# Patient Record
Sex: MALE | Race: WHITE | NOT HISPANIC OR LATINO | Employment: FULL TIME | URBAN - METROPOLITAN AREA
[De-identification: names, ages, dates, MRNs, and addresses within clinical notes are randomized per-mention and may not be internally consistent; named-entity substitution may affect disease eponyms.]

---

## 2017-07-10 DIAGNOSIS — E78.5 HYPERLIPIDEMIA: ICD-10-CM

## 2017-07-10 RX ORDER — ATORVASTATIN CALCIUM 10 MG/1
TABLET, FILM COATED ORAL
Qty: 90 TABLET | Refills: 3 | OUTPATIENT
Start: 2017-07-10

## 2017-07-13 ENCOUNTER — OFFICE VISIT (OUTPATIENT)
Dept: FAMILY MEDICINE CLINIC | Facility: CLINIC | Age: 52
End: 2017-07-13

## 2017-07-13 ENCOUNTER — RESULTS ENCOUNTER (OUTPATIENT)
Dept: FAMILY MEDICINE CLINIC | Facility: CLINIC | Age: 52
End: 2017-07-13

## 2017-07-13 VITALS
HEART RATE: 74 BPM | RESPIRATION RATE: 16 BRPM | DIASTOLIC BLOOD PRESSURE: 82 MMHG | SYSTOLIC BLOOD PRESSURE: 115 MMHG | TEMPERATURE: 97 F | BODY MASS INDEX: 23.86 KG/M2 | WEIGHT: 180 LBS | HEIGHT: 73 IN

## 2017-07-13 DIAGNOSIS — Z12.12 SCREENING FOR MALIGNANT NEOPLASM OF THE RECTUM: ICD-10-CM

## 2017-07-13 DIAGNOSIS — E78.49 OTHER HYPERLIPIDEMIA: Primary | ICD-10-CM

## 2017-07-13 DIAGNOSIS — M10.00 IDIOPATHIC GOUT, UNSPECIFIED CHRONICITY, UNSPECIFIED SITE: ICD-10-CM

## 2017-07-13 DIAGNOSIS — Z12.12 ENCOUNTER FOR COLORECTAL CANCER SCREENING: ICD-10-CM

## 2017-07-13 DIAGNOSIS — M10.079 IDIOPATHIC GOUT INVOLVING TOE, UNSPECIFIED CHRONICITY, UNSPECIFIED LATERALITY: ICD-10-CM

## 2017-07-13 DIAGNOSIS — Z12.11 ENCOUNTER FOR COLORECTAL CANCER SCREENING: ICD-10-CM

## 2017-07-13 DIAGNOSIS — D22.9 NEVUS: ICD-10-CM

## 2017-07-13 DIAGNOSIS — R79.89 ELEVATED SERUM CREATININE: ICD-10-CM

## 2017-07-13 PROCEDURE — 99214 OFFICE O/P EST MOD 30 MIN: CPT | Performed by: FAMILY MEDICINE

## 2017-07-13 RX ORDER — ALLOPURINOL 300 MG/1
300 TABLET ORAL DAILY
Qty: 90 TABLET | Refills: 3 | Status: SHIPPED | OUTPATIENT
Start: 2017-07-13 | End: 2018-06-06 | Stop reason: SDUPTHER

## 2017-07-13 RX ORDER — ATORVASTATIN CALCIUM 10 MG/1
10 TABLET, FILM COATED ORAL NIGHTLY
Qty: 90 TABLET | Refills: 3 | Status: SHIPPED | OUTPATIENT
Start: 2017-07-13 | End: 2018-06-06 | Stop reason: SDUPTHER

## 2017-07-13 NOTE — PROGRESS NOTES
"Chief Complaint   Patient presents with   • Hyperlipidemia   • Gout       Subjective   This patient presents the office to refill medicines.  Lipids are stable.  Gout is stable.  No recent gout attacks.  He would also like to check nevus on left upper chest at the base of the neck.  There is been no interval change.  Recent stressors including losing his father at age 89 , 7/20/17.  I have reviewed and updated his medications, medical history and problem list during today's office visit.        Social History   Substance Use Topics   • Smoking status: Former Smoker   • Smokeless tobacco: Never Used   • Alcohol use Yes       Review of Systems   Constitutional: Negative for fatigue.   Cardiovascular: Negative for chest pain.       Objective   /82  Pulse 74  Temp 97 °F (36.1 °C) (Oral)   Resp 16  Ht 73\" (185.4 cm)  Wt 180 lb (81.6 kg)  BMI 23.75 kg/m2  Body mass index is 23.75 kg/(m^2).  Physical Exam   Constitutional: He is cooperative. No distress.   Eyes: Conjunctivae and lids are normal.   Neck: Carotid bruit is not present. No tracheal deviation present.   Cardiovascular: Normal rate, regular rhythm and normal heart sounds.    No murmur heard.  Pulmonary/Chest: Effort normal and breath sounds normal.   Neurological: He is alert. He is not disoriented.   Skin: Skin is warm and dry.   Psychiatric: He has a normal mood and affect. His speech is normal and behavior is normal.   Vitals reviewed.      Data Reviewed:    Labs show elevated creatinine    Assessment/Plan     Problem List Items Addressed This Visit        Cardiovascular and Mediastinum    Hyperlipidemia - Primary    Relevant Medications    atorvastatin (LIPITOR) 10 MG tablet       Musculoskeletal and Integument    Nevus       Other    Idiopathic gout    Relevant Medications    allopurinol (ZYLOPRIM) 300 MG tablet    Elevated serum creatinine    Relevant Orders    Creatinine, Serum      Other Visit Diagnoses     Encounter for colorectal cancer " screening        Relevant Orders    Cologuard    Screening for malignant neoplasm of the rectum        Relevant Orders    Cologuard          Outpatient Encounter Prescriptions as of 7/13/2017   Medication Sig Dispense Refill   • allopurinol (ZYLOPRIM) 300 MG tablet Take 1 tablet by mouth Daily. 90 tablet 3   • atorvastatin (LIPITOR) 10 MG tablet Take 1 tablet by mouth Every Night. 90 tablet 3   • [DISCONTINUED] allopurinol (ZYLOPRIM) 300 MG tablet Take 1 tablet by mouth daily. 90 tablet 3   • [DISCONTINUED] atorvastatin (LIPITOR) 10 MG tablet Take 1 tablet by mouth every night. 90 tablet 3     No facility-administered encounter medications on file as of 7/13/2017.        Orders Placed This Encounter   Procedures   • Creatinine, Serum     Standing Status:   Future     Standing Expiration Date:   1/9/2018   • Cologuard     Standing Status:   Future     Standing Expiration Date:   7/13/2018       Continue with current treatment plan.         F/U in one year

## 2017-07-13 NOTE — PATIENT INSTRUCTIONS
Please repeat labs(non fasting in August) do not exercise 5 days before labs. Make sure you drink 8 glasses of water daily  For a week prior to the lab testing

## 2017-07-27 ENCOUNTER — RESULTS ENCOUNTER (OUTPATIENT)
Dept: FAMILY MEDICINE CLINIC | Facility: CLINIC | Age: 52
End: 2017-07-27

## 2017-07-27 DIAGNOSIS — R79.89 ELEVATED SERUM CREATININE: ICD-10-CM

## 2017-08-08 DIAGNOSIS — M10.00 IDIOPATHIC GOUT, UNSPECIFIED CHRONICITY, UNSPECIFIED SITE: ICD-10-CM

## 2017-08-08 RX ORDER — ALLOPURINOL 300 MG/1
TABLET ORAL
Qty: 90 TABLET | Refills: 3 | OUTPATIENT
Start: 2017-08-08

## 2018-05-18 DIAGNOSIS — Z00.00 GENERAL MEDICAL EXAM: ICD-10-CM

## 2018-05-18 DIAGNOSIS — M10.079 IDIOPATHIC GOUT INVOLVING TOE, UNSPECIFIED CHRONICITY, UNSPECIFIED LATERALITY: ICD-10-CM

## 2018-05-18 DIAGNOSIS — Z12.5 SCREENING FOR PROSTATE CANCER: ICD-10-CM

## 2018-05-18 DIAGNOSIS — E78.49 OTHER HYPERLIPIDEMIA: Primary | ICD-10-CM

## 2018-06-06 ENCOUNTER — OFFICE VISIT (OUTPATIENT)
Dept: FAMILY MEDICINE CLINIC | Facility: CLINIC | Age: 53
End: 2018-06-06

## 2018-06-06 VITALS
TEMPERATURE: 97.7 F | RESPIRATION RATE: 16 BRPM | SYSTOLIC BLOOD PRESSURE: 108 MMHG | HEART RATE: 79 BPM | DIASTOLIC BLOOD PRESSURE: 68 MMHG | WEIGHT: 180 LBS | BODY MASS INDEX: 23.86 KG/M2 | HEIGHT: 73 IN

## 2018-06-06 DIAGNOSIS — E78.49 OTHER HYPERLIPIDEMIA: Primary | ICD-10-CM

## 2018-06-06 DIAGNOSIS — M1A.0720 IDIOPATHIC CHRONIC GOUT OF LEFT FOOT WITHOUT TOPHUS: ICD-10-CM

## 2018-06-06 DIAGNOSIS — R35.1 NOCTURIA: ICD-10-CM

## 2018-06-06 DIAGNOSIS — R73.9 ELEVATED BLOOD SUGAR: ICD-10-CM

## 2018-06-06 PROBLEM — R79.89 ELEVATED SERUM CREATININE: Status: RESOLVED | Noted: 2017-07-13 | Resolved: 2018-06-06

## 2018-06-06 PROCEDURE — 99214 OFFICE O/P EST MOD 30 MIN: CPT | Performed by: FAMILY MEDICINE

## 2018-06-06 RX ORDER — ALLOPURINOL 300 MG/1
300 TABLET ORAL DAILY
Qty: 90 TABLET | Refills: 3 | Status: SHIPPED | OUTPATIENT
Start: 2018-06-06 | End: 2019-06-27 | Stop reason: SDUPTHER

## 2018-06-06 RX ORDER — ATORVASTATIN CALCIUM 10 MG/1
10 TABLET, FILM COATED ORAL NIGHTLY
Qty: 90 TABLET | Refills: 3 | Status: SHIPPED | OUTPATIENT
Start: 2018-06-06 | End: 2019-06-24 | Stop reason: SDUPTHER

## 2018-06-06 NOTE — PATIENT INSTRUCTIONS
Check on insurance coverage and cost for Shingrix (newest shingles vaccine) and get the immunization at your local pharmacy. It is more effective than the old Zostavax vaccine and is recommended even if you have had the Zostavax vaccine in the past. For more information, please look at the website below:    https://www.cdc.gov/vaccines/vpd/shingles/public/shingrix/index.html

## 2018-06-06 NOTE — PROGRESS NOTES
"Chief Complaint   Patient presents with   • Gout   • Hyperlipidemia       Subjective     I have reviewed and updated his medications, medical history and problem list during today's office visit. Returns the office to review labs and also to refill medications.  Lipids are controlled.  There is still mild elevation of triglycerides.  His gout is controlled and he has not had any recent outbreaks.  Uric acid level is less than 4.  Nocturia is stable.  PSA is 1.6.  He did have a mild elevation of his fasting blood sugar and I advised him to avoid cookies and soft drinks and continue with healthy eating.    Patient Care Team:  Juan Treadwell MD as PCP - General (Family Medicine)    Social History   Substance Use Topics   • Smoking status: Former Smoker   • Smokeless tobacco: Never Used   • Alcohol use Yes       Review of Systems   Constitutional: Negative for fatigue.   Cardiovascular: Negative for chest pain.       Objective     /68   Pulse 79   Temp 97.7 °F (36.5 °C) (Oral)   Resp 16   Ht 185.4 cm (73\")   Wt 81.6 kg (180 lb)   BMI 23.75 kg/m²     Body mass index is 23.75 kg/m².    Physical Exam   Constitutional: He is oriented to person, place, and time. He appears well-developed. No distress.   Eyes: Conjunctivae and lids are normal.   Neck: Carotid bruit is not present.   Cardiovascular: Normal rate, regular rhythm and normal heart sounds.    Pulmonary/Chest: Effort normal and breath sounds normal.   Neurological: He is alert and oriented to person, place, and time.   Skin: Skin is warm and dry.   Psychiatric: He has a normal mood and affect. His behavior is normal.   Vitals reviewed.       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    Relevant Medications    allopurinol (ZYLOPRIM) 300 MG tablet    Other Relevant Orders    Uric Acid    Nocturia    Relevant Orders    PSA    Elevated blood sugar          Orders Placed This Encounter   Procedures   • Comprehensive metabolic panel     Standing Status:   Future     Standing Expiration Date:   7/11/2019   • Lipid panel     Standing Status:   Future     Standing Expiration Date:   7/11/2019   • PSA     Standing Status:   Future     Standing Expiration Date:   7/11/2019   • Uric Acid     Standing Status:   Future     Standing Expiration Date:   7/11/2019   • CBC and Differential     Standing Status:   Future     Standing Expiration Date:   7/11/2019     Order Specific Question:   Manual Differential     Answer:   No         Current Outpatient Prescriptions:   •  allopurinol (ZYLOPRIM) 300 MG tablet, Take 1 tablet by mouth Daily., Disp: 90 tablet, Rfl: 3  •  atorvastatin (LIPITOR) 10 MG tablet, Take 1 tablet by mouth Every Night., Disp: 90 tablet, Rfl: 3    Return in about 1 year (around 6/6/2019) for Recheck.

## 2018-07-03 DIAGNOSIS — E78.49 OTHER HYPERLIPIDEMIA: ICD-10-CM

## 2018-07-03 RX ORDER — ATORVASTATIN CALCIUM 10 MG/1
TABLET, FILM COATED ORAL
Qty: 90 TABLET | Refills: 3 | OUTPATIENT
Start: 2018-07-03

## 2018-08-01 DIAGNOSIS — M10.00 IDIOPATHIC GOUT, UNSPECIFIED CHRONICITY, UNSPECIFIED SITE: ICD-10-CM

## 2018-08-01 RX ORDER — ALLOPURINOL 300 MG/1
TABLET ORAL
Qty: 90 TABLET | Refills: 3 | OUTPATIENT
Start: 2018-08-01

## 2019-05-02 ENCOUNTER — RESULTS ENCOUNTER (OUTPATIENT)
Dept: FAMILY MEDICINE CLINIC | Facility: CLINIC | Age: 54
End: 2019-05-02

## 2019-05-02 DIAGNOSIS — E78.49 OTHER HYPERLIPIDEMIA: ICD-10-CM

## 2019-05-02 DIAGNOSIS — M1A.0720 IDIOPATHIC CHRONIC GOUT OF LEFT FOOT WITHOUT TOPHUS: ICD-10-CM

## 2019-05-02 DIAGNOSIS — R35.1 NOCTURIA: ICD-10-CM

## 2019-06-24 DIAGNOSIS — E78.49 OTHER HYPERLIPIDEMIA: ICD-10-CM

## 2019-06-24 RX ORDER — ATORVASTATIN CALCIUM 10 MG/1
TABLET, FILM COATED ORAL
Qty: 90 TABLET | Refills: 0 | Status: SHIPPED | OUTPATIENT
Start: 2019-06-24 | End: 2019-06-27 | Stop reason: SDUPTHER

## 2019-06-27 ENCOUNTER — OFFICE VISIT (OUTPATIENT)
Dept: FAMILY MEDICINE CLINIC | Facility: CLINIC | Age: 54
End: 2019-06-27

## 2019-06-27 VITALS
DIASTOLIC BLOOD PRESSURE: 72 MMHG | WEIGHT: 175 LBS | OXYGEN SATURATION: 99 % | RESPIRATION RATE: 16 BRPM | TEMPERATURE: 97.8 F | HEIGHT: 73 IN | BODY MASS INDEX: 23.19 KG/M2 | SYSTOLIC BLOOD PRESSURE: 113 MMHG | HEART RATE: 68 BPM

## 2019-06-27 DIAGNOSIS — R73.9 ELEVATED BLOOD SUGAR: ICD-10-CM

## 2019-06-27 DIAGNOSIS — M1A.0720 IDIOPATHIC CHRONIC GOUT OF LEFT FOOT WITHOUT TOPHUS: ICD-10-CM

## 2019-06-27 DIAGNOSIS — R35.1 NOCTURIA: ICD-10-CM

## 2019-06-27 DIAGNOSIS — Z13.6 SCREENING FOR ISCHEMIC HEART DISEASE: ICD-10-CM

## 2019-06-27 DIAGNOSIS — E78.49 OTHER HYPERLIPIDEMIA: Primary | ICD-10-CM

## 2019-06-27 PROCEDURE — 99214 OFFICE O/P EST MOD 30 MIN: CPT | Performed by: FAMILY MEDICINE

## 2019-06-27 RX ORDER — ALLOPURINOL 300 MG/1
300 TABLET ORAL DAILY
Qty: 90 TABLET | Refills: 3 | Status: SHIPPED | OUTPATIENT
Start: 2019-06-27 | End: 2020-07-01 | Stop reason: SDUPTHER

## 2019-06-27 RX ORDER — ATORVASTATIN CALCIUM 10 MG/1
10 TABLET, FILM COATED ORAL NIGHTLY
Qty: 90 TABLET | Refills: 3 | Status: SHIPPED | OUTPATIENT
Start: 2019-06-27 | End: 2020-07-01 | Stop reason: SDUPTHER

## 2019-06-27 NOTE — PROGRESS NOTES
"Rooming Tab(CC,VS,Pt Hx,Fall Screen)  Chief Complaint   Patient presents with   • Hyperlipidemia       Subjective     Dago Zambrano presents to the office today to refill his medications. No medication side effects are reported.  Lipids are stable pending lab results.  No recent outbreaks of gout.  Labs are due.  He has stable nocturia.  Annual PSA recommended.  History of elevated blood sugar.  We will reevaluate that with upcoming labs.  Overall he feels well.    I have reviewed and updated his medications, medical history and problem list during today's office visit.     Patient Care Team:  Juan Treadwell MD as PCP - General (Family Medicine)    Problem List Tab  Medications Tab  Synopsis Tab  Chart Review Tab  Care Everywhere Tab  Immunizations Tab  Patient History Tab    Social History     Tobacco Use   • Smoking status: Former Smoker   • Smokeless tobacco: Never Used   Substance Use Topics   • Alcohol use: Yes       Review of Systems   Constitutional: Negative for fatigue.   Cardiovascular: Negative for chest pain.       Objective     Rooming Tab(CC,VS,Pt Hx,Fall Screen)  /72   Pulse 68   Temp 97.8 °F (36.6 °C) (Oral)   Resp 16   Ht 185.4 cm (73\")   Wt 79.4 kg (175 lb)   SpO2 99%   BMI 23.09 kg/m²     Body mass index is 23.09 kg/m².    Physical Exam   Constitutional: He is oriented to person, place, and time. He appears well-developed. No distress.   Eyes: Conjunctivae and lids are normal.   Neck: Carotid bruit is not present.   Cardiovascular: Normal rate, regular rhythm and normal heart sounds.   Pulmonary/Chest: Effort normal and breath sounds normal.   Neurological: He is alert and oriented to person, place, and time.   Skin: Skin is warm and dry.   Psychiatric: He has a normal mood and affect. His behavior is normal.   Vitals reviewed.      Statin Decision Aid  Data Reviewed:                   Assessment/Plan   Order Review Tab  Health Maintenance Tab  Patient Plan/Order Tab  Diagnoses and all " orders for this visit:    1. Other hyperlipidemia (Primary)  Assessment & Plan:  Lipid abnormalities are unchanged.  Pharmacotherapy as ordered.  Lipids will be reassessed in 1 year.    Orders:  -     Comprehensive Metabolic Panel; Future  -     CBC & Differential; Future  -     Lipid Panel With / Chol / HDL Ratio; Future  -     CK; Future  -     atorvastatin (LIPITOR) 10 MG tablet; Take 1 tablet by mouth Every Night.  Dispense: 90 tablet; Refill: 3    2. Idiopathic chronic gout of left foot without tophus  Assessment & Plan:  Continue current therapy.  Condition stable.    Orders:  -     Uric Acid; Future  -     allopurinol (ZYLOPRIM) 300 MG tablet; Take 1 tablet by mouth Daily.  Dispense: 90 tablet; Refill: 3    3. Nocturia  Assessment & Plan:  Condition stable.  Annual PSA lab testing.    Orders:  -     PSA DIAGNOSTIC; Future    4. Elevated blood sugar  Assessment & Plan:  Labs to reevaluate fasting blood sugar levels.    Orders:  -     Comprehensive Metabolic Panel; Future    5. Screening for ischemic heart disease      Wrapup Tab  Return in about 1 year (around 6/27/2020).

## 2019-06-28 ENCOUNTER — RESULTS ENCOUNTER (OUTPATIENT)
Dept: FAMILY MEDICINE CLINIC | Facility: CLINIC | Age: 54
End: 2019-06-28

## 2019-06-28 DIAGNOSIS — R73.9 ELEVATED BLOOD SUGAR: ICD-10-CM

## 2019-06-28 DIAGNOSIS — E78.49 OTHER HYPERLIPIDEMIA: ICD-10-CM

## 2019-06-28 DIAGNOSIS — R35.1 NOCTURIA: ICD-10-CM

## 2019-06-28 DIAGNOSIS — M1A.0720 IDIOPATHIC CHRONIC GOUT OF LEFT FOOT WITHOUT TOPHUS: ICD-10-CM

## 2020-07-01 ENCOUNTER — OFFICE VISIT (OUTPATIENT)
Dept: FAMILY MEDICINE CLINIC | Facility: CLINIC | Age: 55
End: 2020-07-01

## 2020-07-01 VITALS
HEART RATE: 72 BPM | TEMPERATURE: 97.3 F | BODY MASS INDEX: 23.33 KG/M2 | SYSTOLIC BLOOD PRESSURE: 112 MMHG | HEIGHT: 73 IN | WEIGHT: 176 LBS | DIASTOLIC BLOOD PRESSURE: 75 MMHG | OXYGEN SATURATION: 97 % | RESPIRATION RATE: 16 BRPM

## 2020-07-01 DIAGNOSIS — E78.49 OTHER HYPERLIPIDEMIA: Primary | ICD-10-CM

## 2020-07-01 DIAGNOSIS — M1A.0720 IDIOPATHIC CHRONIC GOUT OF LEFT FOOT WITHOUT TOPHUS: ICD-10-CM

## 2020-07-01 PROBLEM — R35.1 NOCTURIA: Status: RESOLVED | Noted: 2018-06-06 | Resolved: 2020-07-01

## 2020-07-01 PROCEDURE — 99213 OFFICE O/P EST LOW 20 MIN: CPT | Performed by: FAMILY MEDICINE

## 2020-07-01 RX ORDER — ATORVASTATIN CALCIUM 10 MG/1
10 TABLET, FILM COATED ORAL NIGHTLY
Qty: 90 TABLET | Refills: 3 | Status: SHIPPED | OUTPATIENT
Start: 2020-07-01 | End: 2021-06-23 | Stop reason: SDUPTHER

## 2020-07-01 RX ORDER — ALLOPURINOL 300 MG/1
300 TABLET ORAL DAILY
Qty: 90 TABLET | Refills: 3 | Status: SHIPPED | OUTPATIENT
Start: 2020-07-01 | End: 2021-06-23 | Stop reason: SDUPTHER

## 2020-07-01 NOTE — PROGRESS NOTES
"   Subjective       Chief Complaint   Patient presents with   • Hyperlipidemia     1 year follow up          HPI:       HPI   Pt is here to refill meds. Labs are due. He feels great. No gout attacks. Lipids stable.     Review of Systems   Constitutional: Negative for fatigue.   Cardiovascular: Negative for chest pain.   Genitourinary: Negative for difficulty urinating.        I have reviewed and confirmed the accuracy of the ROS as documented by myself or MA/LPN/RN Juan Treadwell MD          PE:   Objective   /75   Pulse 72   Temp 97.3 °F (36.3 °C) (Tympanic)   Resp 16   Ht 185.4 cm (73\")   Wt 79.8 kg (176 lb)   SpO2 97%   BMI 23.22 kg/m²     Body mass index is 23.22 kg/m².    Physical Exam   Constitutional: He is oriented to person, place, and time. He appears well-developed. No distress.   Eyes: Conjunctivae and lids are normal.   Neck: Carotid bruit is not present.   Cardiovascular: Normal rate, regular rhythm and normal heart sounds.   Pulmonary/Chest: Effort normal and breath sounds normal.   Neurological: He is alert and oriented to person, place, and time.   Skin: Skin is warm and dry.   Psychiatric: He has a normal mood and affect. His behavior is normal.   Vitals reviewed.         Data Reviewed:                  A/P:     Assessment/Plan   Diagnoses and all orders for this visit:    1. Other hyperlipidemia (Primary)  Assessment & Plan:  Lipid abnormalities are unchanged.  Pharmacotherapy as ordered.  Lipids will be reassessed in 1 year.    Orders:  -     atorvastatin (LIPITOR) 10 MG tablet; Take 1 tablet by mouth Every Night.  Dispense: 90 tablet; Refill: 3  -     Comprehensive Metabolic Panel; Future  -     CBC & Differential; Future  -     Lipid Panel With / Chol / HDL Ratio; Future  -     CK; Future    2. Idiopathic chronic gout of left foot without tophus  Assessment & Plan:  The current medical regimen is effective;  continue present plan and medications.      Orders:  -     allopurinol " (ZYLOPRIM) 300 MG tablet; Take 1 tablet by mouth Daily.  Dispense: 90 tablet; Refill: 3  -     Uric Acid; Future        Follow up:    Return in about 1 year (around 7/1/2021) for Recheck/Medication  Refill.

## 2020-07-02 ENCOUNTER — RESULTS ENCOUNTER (OUTPATIENT)
Dept: FAMILY MEDICINE CLINIC | Facility: CLINIC | Age: 55
End: 2020-07-02

## 2020-07-02 DIAGNOSIS — E78.49 OTHER HYPERLIPIDEMIA: ICD-10-CM

## 2020-07-02 DIAGNOSIS — M1A.0720 IDIOPATHIC CHRONIC GOUT OF LEFT FOOT WITHOUT TOPHUS: ICD-10-CM

## 2020-08-13 ENCOUNTER — TELEPHONE (OUTPATIENT)
Dept: FAMILY MEDICINE CLINIC | Facility: CLINIC | Age: 55
End: 2020-08-13

## 2020-08-13 NOTE — TELEPHONE ENCOUNTER
Bo he has a copy of his labs.  Call him and let him know that they were all within normal limits.  Lipids were normal kidney function normal but electrolytes were normal uric acid was controlled.  I did not see any deficiencies and no differences that required anything new in his treatment regimen.  If this is satisfactory then we are done if he still needs or has questions let me know.  Thanks, Dr. Treadwell

## 2021-06-23 ENCOUNTER — OFFICE VISIT (OUTPATIENT)
Dept: FAMILY MEDICINE CLINIC | Facility: CLINIC | Age: 56
End: 2021-06-23

## 2021-06-23 VITALS
DIASTOLIC BLOOD PRESSURE: 70 MMHG | OXYGEN SATURATION: 95 % | TEMPERATURE: 97.1 F | SYSTOLIC BLOOD PRESSURE: 110 MMHG | RESPIRATION RATE: 16 BRPM | HEART RATE: 75 BPM | BODY MASS INDEX: 23.33 KG/M2 | WEIGHT: 176 LBS | HEIGHT: 73 IN

## 2021-06-23 DIAGNOSIS — E78.49 OTHER HYPERLIPIDEMIA: Primary | ICD-10-CM

## 2021-06-23 DIAGNOSIS — M1A.0720 IDIOPATHIC CHRONIC GOUT OF LEFT FOOT WITHOUT TOPHUS: ICD-10-CM

## 2021-06-23 PROBLEM — R73.9 ELEVATED BLOOD SUGAR: Status: RESOLVED | Noted: 2018-06-06 | Resolved: 2021-06-23

## 2021-06-23 PROBLEM — Z86.69 HISTORY OF CHOLESTEATOMA: Status: ACTIVE | Noted: 2021-06-23

## 2021-06-23 PROCEDURE — 99214 OFFICE O/P EST MOD 30 MIN: CPT | Performed by: FAMILY MEDICINE

## 2021-06-23 RX ORDER — ATORVASTATIN CALCIUM 10 MG/1
10 TABLET, FILM COATED ORAL NIGHTLY
Qty: 90 TABLET | Refills: 3 | Status: SHIPPED | OUTPATIENT
Start: 2021-06-23 | End: 2022-06-08

## 2021-06-23 RX ORDER — ALLOPURINOL 300 MG/1
300 TABLET ORAL DAILY
Qty: 90 TABLET | Refills: 3 | Status: SHIPPED | OUTPATIENT
Start: 2021-06-23 | End: 2022-06-08 | Stop reason: SDUPTHER

## 2021-06-23 NOTE — PROGRESS NOTES
"Chief Complaint  Hyperlipidemia (meds)    Malachi Loza presents to [facility] to refill medications.  Labs are due.  Patient plans on getting these labs later this week.  No medication side effects are reported. Overall the patient is feeling well.  Since last visit the patient has had right ear surgery and has had wonderful results.  His hearing is returned to normal.  Apparently it was a mass consistent with a cholesteatoma.  Review of Systems   Genitourinary: Negative for difficulty urinating.        Objective   Vital Signs:   Vitals:    06/23/21 1512   BP: 110/70   Pulse: 75   Resp: 16   Temp: 97.1 °F (36.2 °C)   TempSrc: Tympanic   SpO2: 95%   Weight: 79.8 kg (176 lb)   Height: 185.4 cm (73\")   PainSc: 0-No pain        Physical Exam  Vitals reviewed.   Constitutional:       General: He is not in acute distress.  Eyes:      General: Lids are normal.      Conjunctiva/sclera: Conjunctivae normal.   Neck:      Vascular: No carotid bruit.      Trachea: No tracheal deviation.   Cardiovascular:      Rate and Rhythm: Normal rate and regular rhythm.      Heart sounds: Normal heart sounds. No murmur heard.     Pulmonary:      Effort: Pulmonary effort is normal.      Breath sounds: Normal breath sounds.   Skin:     General: Skin is warm and dry.   Neurological:      Mental Status: He is alert. He is not disoriented.   Psychiatric:         Speech: Speech normal.         Behavior: Behavior normal. Behavior is cooperative.          Result Review :     The following data was reviewed by: Juan Treadwell MD on 06/23/2021:  [facility] (02/19/2021)           Assessment and Plan    Diagnoses and all orders for this visit:    1. Other hyperlipidemia (Primary)  Assessment & Plan:  The current medical regimen is effective;  continue present plan and medications.        Orders:  -     atorvastatin (LIPITOR) 10 MG tablet; Take 1 tablet by mouth Every Night.  Dispense: 90 tablet; Refill: " 3  -     Comprehensive Metabolic Panel; Future  -     CBC & Differential; Future  -     Lipid Panel With / Chol / HDL Ratio; Future  -     CK; Future    2. Idiopathic chronic gout of left foot without tophus  Assessment & Plan:  The current medical regimen is effective;  continue present plan and medications.        Orders:  -     allopurinol (ZYLOPRIM) 300 MG tablet; Take 1 tablet by mouth Daily.  Dispense: 90 tablet; Refill: 3  -     Uric Acid; Future      Follow Up   Return in about 1 year (around 6/23/2022).  Patient was given instructions and counseling regarding his condition or for health maintenance advice. Please see specific information pulled into the AVS if appropriate.

## 2021-06-25 ENCOUNTER — TELEPHONE (OUTPATIENT)
Dept: FAMILY MEDICINE CLINIC | Facility: CLINIC | Age: 56
End: 2021-06-25

## 2021-06-25 DIAGNOSIS — Z12.12 SCREENING FOR MALIGNANT NEOPLASM OF THE RECTUM: Primary | ICD-10-CM

## 2021-06-25 DIAGNOSIS — Z12.11 SPECIAL SCREENING FOR MALIGNANT NEOPLASMS, COLON: ICD-10-CM

## 2021-07-09 DIAGNOSIS — M1A.0720 IDIOPATHIC CHRONIC GOUT OF LEFT FOOT WITHOUT TOPHUS: ICD-10-CM

## 2021-07-09 DIAGNOSIS — E78.49 OTHER HYPERLIPIDEMIA: ICD-10-CM

## 2021-07-09 RX ORDER — ATORVASTATIN CALCIUM 10 MG/1
TABLET, FILM COATED ORAL
Qty: 90 TABLET | Refills: 3 | OUTPATIENT
Start: 2021-07-09

## 2021-07-09 RX ORDER — ALLOPURINOL 300 MG/1
TABLET ORAL
Qty: 90 TABLET | Refills: 3 | OUTPATIENT
Start: 2021-07-09

## 2022-06-08 ENCOUNTER — OFFICE VISIT (OUTPATIENT)
Dept: FAMILY MEDICINE CLINIC | Facility: CLINIC | Age: 57
End: 2022-06-08

## 2022-06-08 VITALS
HEIGHT: 73 IN | DIASTOLIC BLOOD PRESSURE: 74 MMHG | HEART RATE: 94 BPM | RESPIRATION RATE: 18 BRPM | WEIGHT: 173 LBS | OXYGEN SATURATION: 99 % | SYSTOLIC BLOOD PRESSURE: 122 MMHG | BODY MASS INDEX: 22.93 KG/M2 | TEMPERATURE: 97 F

## 2022-06-08 DIAGNOSIS — M1A.0720 IDIOPATHIC CHRONIC GOUT OF LEFT FOOT WITHOUT TOPHUS: ICD-10-CM

## 2022-06-08 DIAGNOSIS — K76.0 FATTY LIVER: ICD-10-CM

## 2022-06-08 DIAGNOSIS — E78.49 OTHER HYPERLIPIDEMIA: Primary | ICD-10-CM

## 2022-06-08 DIAGNOSIS — I25.10 CORONARY ARTERY DISEASE INVOLVING NATIVE CORONARY ARTERY OF NATIVE HEART WITHOUT ANGINA PECTORIS: ICD-10-CM

## 2022-06-08 DIAGNOSIS — N32.89 BLADDER WALL THICKENING: ICD-10-CM

## 2022-06-08 PROBLEM — Z86.16 HISTORY OF COVID-19: Status: ACTIVE | Noted: 2022-06-08

## 2022-06-08 PROCEDURE — 99214 OFFICE O/P EST MOD 30 MIN: CPT | Performed by: FAMILY MEDICINE

## 2022-06-08 RX ORDER — ALLOPURINOL 300 MG/1
300 TABLET ORAL DAILY
Qty: 90 TABLET | Refills: 3 | Status: SHIPPED | OUTPATIENT
Start: 2022-06-08 | End: 2023-06-08

## 2022-06-08 RX ORDER — ASPIRIN 81 MG/1
81 TABLET ORAL NIGHTLY
Qty: 30 TABLET | Refills: 0
Start: 2022-06-08 | End: 2022-07-08

## 2022-06-08 RX ORDER — ATORVASTATIN CALCIUM 20 MG/1
20 TABLET, FILM COATED ORAL DAILY
COMMUNITY
End: 2022-06-08 | Stop reason: SDUPTHER

## 2022-06-08 RX ORDER — ATORVASTATIN CALCIUM 20 MG/1
20 TABLET, FILM COATED ORAL NIGHTLY
Qty: 90 TABLET | Refills: 1 | Status: SHIPPED | OUTPATIENT
Start: 2022-06-08 | End: 2022-07-22 | Stop reason: SDUPTHER

## 2022-06-08 NOTE — PROGRESS NOTES
"Chief Complaint  Hyperlipidemia (Follow up )    Subjective     {Problem List  Visit Diagnosis  Review (Popup)  Encounters  Notes  Medications  Labs  Result Review Imaging  Media :23}     Dago presents to [facility]  To follow-up from recent ER stay.  Patient was diagnosed with COVID in May.  He had had some abdominal discomfort and that continues somewhat.  Patient has had ongoing fatigue.  Currently he is out of work due to the COVID.  Refills of medicines created.  Incidental finding from CT during ER visit showed coronary artery atherosclerosis.  Incidental finding of bladder wall thickening found.  Incidental finding of fatty liver found.  We talked about each of these in detail.  Referral to urology needed.  Advised decreasing alcohol intake for fatty liver.  Continue with statin therapy with goal of LDL cholesterol less than 74 coronary atherosclerosis.  He will add aspirin therapy to his current regimen.  Plan repeating labs in 3 months and having him follow-up with us in about 4 months for current condition.        Objective   Vital Signs:   Vitals:    06/08/22 1553   BP: 122/74   Pulse: 94   Resp: 18   Temp: 97 °F (36.1 °C)   SpO2: 99%   Weight: 78.5 kg (173 lb)   Height: 185.4 cm (73\")   PainSc:   4   PainLoc: Back        BMI is within normal parameters. No other follow-up for BMI required.       Physical Exam  Vitals reviewed.   Constitutional:       General: He is not in acute distress.  Eyes:      General: Lids are normal.      Conjunctiva/sclera: Conjunctivae normal.   Neck:      Vascular: No carotid bruit.      Trachea: No tracheal deviation.   Cardiovascular:      Rate and Rhythm: Normal rate and regular rhythm.      Heart sounds: Normal heart sounds. No murmur heard.  Pulmonary:      Effort: Pulmonary effort is normal.      Breath sounds: Normal breath sounds.   Skin:     General: Skin is warm and dry.   Neurological:      Mental Status: He is alert. He is not " disoriented.   Psychiatric:         Speech: Speech normal.         Behavior: Behavior normal. Behavior is cooperative.          Result Review :     The following data was reviewed by: Juan Treadwell MD on 06/08/2022:  IMPRESSION:   1. No CT evidence of acute appendicitis.   2. Bladder wall thickening may be exaggerated by degree of distention. Outlet obstruction or cystitis is not excluded.   3. Possible mild fatty infiltration of the liver.   4. Atherosclerotic vascular calcification and coronary artery calcification.             Assessment and Plan    Diagnoses and all orders for this visit:    1. Other hyperlipidemia (Primary)  Assessment & Plan:  The current medical regimen is effective;  continue present plan and medications.  Goal LDL <70    Orders:  -     atorvastatin (LIPITOR) 20 MG tablet; Take 1 tablet by mouth Every Night for 180 days.  Dispense: 90 tablet; Refill: 1  -     Comprehensive Metabolic Panel; Future  -     CBC & Differential; Future  -     Lipid Panel With / Chol / HDL Ratio; Future  -     CK; Future    2. Coronary artery disease involving native coronary artery of native heart without angina pectoris  Assessment & Plan:  Coronary artery disease is newly identified.  Medication changes per orders.  Cardiac status will be reassessed in 3 months.    Orders:  -     aspirin (aspirin) 81 MG EC tablet; Take 1 tablet by mouth Every Night for 30 days.  Dispense: 30 tablet; Refill: 0  -     Lipid Panel With / Chol / HDL Ratio; Future    3. Idiopathic chronic gout of left foot without tophus  Assessment & Plan:  The current medical regimen is effective;  continue present plan and medications.      Orders:  -     allopurinol (ZYLOPRIM) 300 MG tablet; Take 1 tablet by mouth Daily.  Dispense: 90 tablet; Refill: 3  -     Comprehensive Metabolic Panel; Future  -     CBC & Differential; Future  -     Uric Acid; Future    4. Fatty liver  Assessment & Plan:  Advised decreasing or eliminating alcohol.      5.  Bladder wall thickening  Assessment & Plan:  Keep appointment with urology    Orders:  -     Ambulatory Referral to Urology; Future      Follow Up   Return in about 4 months (around 10/8/2022).  Patient was given instructions and counseling regarding his condition or for health maintenance advice. Please see specific information pulled into the AVS if appropriate.

## 2022-06-08 NOTE — PATIENT INSTRUCTIONS
Cholesterol Content in Foods  Cholesterol is a waxy, fat-like substance that helps to carry fat in the blood. The body needs cholesterol in small amounts, but too much cholesterol can cause damage to the arteries and heart.  Most people should eat less than 200 milligrams (mg) of cholesterol a day.  Foods with cholesterol    Cholesterol is found in animal-based foods, such as meat, seafood, and dairy. Generally, low-fat dairy and lean meats have less cholesterol than full-fat dairy and fatty meats. The milligrams of cholesterol per serving (mg per serving) of common cholesterol-containing foods are listed below.  Meat and other proteins  Egg -- one large whole egg has 186 mg.  Veal shank -- 4 oz has 141 mg.  Lean ground turkey (93% lean) -- 4 oz has 118 mg.  Fat-trimmed lamb loin -- 4 oz has 106 mg.  Lean ground beef (90% lean) -- 4 oz has 100 mg.  Lobster -- 3.5 oz has 90 mg.  Pork loin chops -- 4 oz has 86 mg.  Canned salmon -- 3.5 oz has 83 mg.  Fat-trimmed beef top loin -- 4 oz has 78 mg.  Frankfurter -- 1 regi (3.5 oz) has 77 mg.  Crab -- 3.5 oz has 71 mg.  Roasted chicken without skin, white meat -- 4 oz has 66 mg.  Light bologna -- 2 oz has 45 mg.  Deli-cut turkey -- 2 oz has 31 mg.  Canned tuna -- 3.5 oz has 31 mg.  Riggs -- 1 oz has 29 mg.  Oysters and mussels (raw) -- 3.5 oz has 25 mg.  Mackerel -- 1 oz has 22 mg.  Trout -- 1 oz has 20 mg.  Pork sausage -- 1 link (1 oz) has 17 mg.  Devers -- 1 oz has 16 mg.  Tilapia -- 1 oz has 14 mg.  Dairy  Soft-serve ice cream -- ½ cup (4 oz) has 103 mg.  Whole-milk yogurt -- 1 cup (8 oz) has 29 mg.  Cheddar cheese -- 1 oz has 28 mg.  American cheese -- 1 oz has 28 mg.  Whole milk -- 1 cup (8 oz) has 23 mg.  2% milk -- 1 cup (8 oz) has 18 mg.  Cream cheese -- 1 tablespoon (Tbsp) has 15 mg.  Cottage cheese -- ½ cup (4 oz) has 14 mg.  Low-fat (1%) milk -- 1 cup (8 oz) has 10 mg.  Sour cream -- 1 Tbsp has 8.5 mg.  Low-fat yogurt -- 1 cup (8 oz) has 8 mg.  Nonfat Greek  yogurt -- 1 cup (8 oz) has 7 mg.  Half-and-half cream -- 1 Tbsp has 5 mg.  Fats and oils  Cod liver oil -- 1 tablespoon (Tbsp) has 82 mg.  Butter -- 1 Tbsp has 15 mg.  Lard -- 1 Tbsp has 14 mg.  Riggs grease -- 1 Tbsp has 14 mg.  Mayonnaise -- 1 Tbsp has 5-10 mg.  Margarine -- 1 Tbsp has 3-10 mg.  Exact amounts of cholesterol in these foods may vary depending on specific ingredients and brands.  Foods without cholesterol  Most plant-based foods do not have cholesterol unless you combine them with a food that has cholesterol. Foods without cholesterol include:  Grains and cereals.  Vegetables.  Fruits.  Vegetable oils, such as olive, canola, and sunflower oil.  Legumes, such as peas, beans, and lentils.  Nuts and seeds.  Egg whites.  Summary  The body needs cholesterol in small amounts, but too much cholesterol can cause damage to the arteries and heart.  Most people should eat less than 200 milligrams (mg) of cholesterol a day.  This information is not intended to replace advice given to you by your health care provider. Make sure you discuss any questions you have with your health care provider.  Document Revised: 05/10/2021 Document Reviewed: 05/10/2021  [employer] Patient Education © 2021 [employer]

## 2022-06-08 NOTE — ASSESSMENT & PLAN NOTE
Coronary artery disease is newly identified.  Medication changes per orders.  Cardiac status will be reassessed in 3 months.

## 2022-06-29 ENCOUNTER — TELEPHONE (OUTPATIENT)
Dept: FAMILY MEDICINE CLINIC | Facility: CLINIC | Age: 57
End: 2022-06-29

## 2022-06-29 DIAGNOSIS — Z12.5 SCREENING FOR PROSTATE CANCER: Primary | ICD-10-CM

## 2022-06-29 NOTE — TELEPHONE ENCOUNTER
Caller: Dago Zambrano    Relationship: Self    Best call back number: [phone]  What orders are you requesting (i.e. lab or imaging): LABS   In what timeframe would the patient need to come in: BEFORE APPT ON 10/12    Where will you receive your lab/imaging services: [facility]     Additional notes: WOULD LIKE  TO MAKE SURE THAT HIS PSA IS CHECKED.  AT THAT TIME AS WELL

## 2022-06-30 DIAGNOSIS — I25.10 CORONARY ARTERY DISEASE INVOLVING NATIVE CORONARY ARTERY OF NATIVE HEART WITHOUT ANGINA PECTORIS: ICD-10-CM

## 2022-06-30 DIAGNOSIS — Z12.5 SCREENING FOR PROSTATE CANCER: ICD-10-CM

## 2022-06-30 DIAGNOSIS — E78.49 OTHER HYPERLIPIDEMIA: ICD-10-CM

## 2022-06-30 DIAGNOSIS — M1A.0720 IDIOPATHIC CHRONIC GOUT OF LEFT FOOT WITHOUT TOPHUS: ICD-10-CM

## 2022-06-30 NOTE — ADDENDUM NOTE
Addended by: SAPPHIRE LANG on: 6/30/2022 01:57 PM     Modules accepted: Orders    
Addended by: SAPPHIRE LANG on: 6/30/2022 01:58 PM     Modules accepted: Orders    
Addended by: SAPPHIRE LANG on: 6/30/2022 01:58 PM     Modules accepted: Orders    
musculoskeletal

## 2022-07-04 DIAGNOSIS — E78.49 OTHER HYPERLIPIDEMIA: ICD-10-CM

## 2022-07-05 RX ORDER — ATORVASTATIN CALCIUM 10 MG/1
TABLET, FILM COATED ORAL
Qty: 90 TABLET | Refills: 3 | OUTPATIENT
Start: 2022-07-05

## 2022-07-12 ENCOUNTER — TELEPHONE (OUTPATIENT)
Dept: FAMILY MEDICINE CLINIC | Facility: CLINIC | Age: 57
End: 2022-07-12

## 2022-07-12 NOTE — TELEPHONE ENCOUNTER
PATIENT CALLED STATING THAT HIS PHARMACY, [facility], HAD INFORMED THE PATIENT THAT HIS PRESCRIPTION HAS BEEN PLACED ON HOLD UNTIL THEY ARE CONTACTED BY DR. HINTON.    PATIENT HAD A 90 DAY SUPPLY SENT IN ON 06/08/22, THAT HE HAS RECEIVED, AND DOESN'T UNDERSTAND WHAT MEDICATION THEY WERE REFERRING TO.    [facility]: [phone]  CASE #: [id number]    PLEASE ADVISE  [phone]

## 2022-07-22 DIAGNOSIS — E78.49 OTHER HYPERLIPIDEMIA: ICD-10-CM

## 2022-07-22 RX ORDER — ATORVASTATIN CALCIUM 20 MG/1
20 TABLET, FILM COATED ORAL NIGHTLY
Qty: 90 TABLET | Refills: 0 | Status: SHIPPED | OUTPATIENT
Start: 2022-07-22 | End: 2022-12-21

## 2022-07-22 NOTE — TELEPHONE ENCOUNTER
Pharmacy Name:  [facility] - [place], [place] - [address] AT PORTAL TO [facility] - [phone] [facility] [phone]     Pharmacy representative phone number:   [facility]: [phone]  ORDER #[id number]    What medication are you calling in regards to: ATORVASTATIN 10 MG    What question does the pharmacy have: PHARMACY GOT DENIAL ON THIS MEDICATION, PLEASE ADVISE WHY    Who is the provider that prescribed the medication: DR HINTON    Additional notes: PLEASE ADVISE

## 2022-09-07 ENCOUNTER — OFFICE VISIT (OUTPATIENT)
Dept: FAMILY MEDICINE CLINIC | Facility: CLINIC | Age: 57
End: 2022-09-07

## 2022-09-07 VITALS
WEIGHT: 158 LBS | DIASTOLIC BLOOD PRESSURE: 66 MMHG | OXYGEN SATURATION: 98 % | RESPIRATION RATE: 18 BRPM | TEMPERATURE: 97.4 F | HEART RATE: 86 BPM | SYSTOLIC BLOOD PRESSURE: 98 MMHG | BODY MASS INDEX: 20.94 KG/M2 | HEIGHT: 73 IN

## 2022-09-07 DIAGNOSIS — R63.4 WEIGHT LOSS: ICD-10-CM

## 2022-09-07 DIAGNOSIS — R63.0 LACK OF APPETITE: Primary | ICD-10-CM

## 2022-09-07 DIAGNOSIS — Z86.16 HISTORY OF COVID-19: ICD-10-CM

## 2022-09-07 PROCEDURE — 99214 OFFICE O/P EST MOD 30 MIN: CPT | Performed by: FAMILY MEDICINE

## 2022-09-07 RX ORDER — PANTOPRAZOLE SODIUM 40 MG/1
40 TABLET, DELAYED RELEASE ORAL NIGHTLY
Qty: 30 TABLET | Refills: 1 | Status: SHIPPED | OUTPATIENT
Start: 2022-09-07 | End: 2022-11-06

## 2022-09-07 NOTE — PROGRESS NOTES
"Chief Complaint  Abdominal Pain (Had COVID 3 months ago and having problems with appetite since then, \"stomach feels weird\", weight loss due to no appetite )    Malachi Loza presents to [facility] to discuss abnormal weight loss over the past 3 months.  He has noticed decreased appetite over the past few weeks.  Symptoms started after his COVID infection back in June.  He is concerned about his abnormal weight loss.  Since our last visit he has cut down on alcohol intake except for on weekends.          Objective   Vital Signs:   Vitals:    09/07/22 1144   BP: 98/66   Pulse: 86   Resp: 18   Temp: 97.4 °F (36.3 °C)   SpO2: 98%   Weight: 71.7 kg (158 lb)   Height: 185.4 cm (73\")        BMI is within normal parameters. No other follow-up for BMI required.        Physical Exam  Constitutional:       General: He is not in acute distress.     Appearance: He is not ill-appearing.   Abdominal:      Tenderness: There is abdominal tenderness in the right upper quadrant and epigastric area. There is no guarding or rebound.   Neurological:      Mental Status: He is alert.   Psychiatric:      Comments: Worried affect          Result Review :                Assessment and Plan    Diagnoses and all orders for this visit:    1. Lack of appetite (Primary)  Assessment & Plan:  New problem in the past few weeks. Stomach problem in past 3 months since covid. Trial PPI    Orders:  -     pantoprazole (PROTONIX) 40 MG EC tablet; Take 1 tablet by mouth Every Night for 60 days.  Dispense: 30 tablet; Refill: 1  -     Ambulatory Referral to Gastroenterology  -     US Abdomen Complete; Future    2. Weight loss  Assessment & Plan:  Referral to GI.     Orders:  -     pantoprazole (PROTONIX) 40 MG EC tablet; Take 1 tablet by mouth Every Night for 60 days.  Dispense: 30 tablet; Refill: 1  -     Ambulatory Referral to Gastroenterology  -     US Abdomen Complete; Future    3. History of COVID-19  -     " pantoprazole (PROTONIX) 40 MG EC tablet; Take 1 tablet by mouth Every Night for 60 days.  Dispense: 30 tablet; Refill: 1  -     Ambulatory Referral to Gastroenterology  -     US Abdomen Complete; Future      Follow Up   Return in 5 weeks (on 10/12/2022), or if symptoms worsen or fail to improve, for next scheduled follow up.  Patient was given instructions and counseling regarding his condition or for health maintenance advice. Please see specific information pulled into the AVS if appropriate.

## 2022-09-23 ENCOUNTER — APPOINTMENT (OUTPATIENT)
Dept: GENERAL RADIOLOGY | Facility: HOSPITAL | Age: 57
End: 2022-09-23

## 2022-09-23 ENCOUNTER — HOSPITAL ENCOUNTER (OUTPATIENT)
Facility: HOSPITAL | Age: 57
Discharge: HOME | End: 2022-09-28
Attending: EMERGENCY MEDICINE | Admitting: INTERNAL MEDICINE

## 2022-09-23 DIAGNOSIS — I20.1 ANGINA PECTORIS WITH DOCUMENTED SPASM: ICD-10-CM

## 2022-09-23 DIAGNOSIS — Z87.39 HISTORY OF GOUT: ICD-10-CM

## 2022-09-23 DIAGNOSIS — R07.9 CHEST PAIN, UNSPECIFIED TYPE: Primary | ICD-10-CM

## 2022-09-23 DIAGNOSIS — Z86.39 HISTORY OF HYPERCHOLESTEROLEMIA: ICD-10-CM

## 2022-09-23 LAB
ALBUMIN SERPL-MCNC: 4.4 G/DL (ref 3.5–5.2)
ALBUMIN/GLOB SERPL: 2.8 G/DL
ALP SERPL-CCNC: 46 U/L (ref 39–117)
ALT SERPL W P-5'-P-CCNC: 15 U/L (ref 1–41)
ANION GAP SERPL CALCULATED.3IONS-SCNC: 15 MMOL/L (ref 5–15)
AST SERPL-CCNC: 20 U/L (ref 1–40)
BASOPHILS # BLD AUTO: 0.02 10*3/MM3 (ref 0–0.2)
BASOPHILS NFR BLD AUTO: 0.4 % (ref 0–1.5)
BILIRUB SERPL-MCNC: 0.9 MG/DL (ref 0–1.2)
BUN SERPL-MCNC: 14 MG/DL (ref 6–20)
BUN/CREAT SERPL: 14.3 (ref 7–25)
CALCIUM SPEC-SCNC: 9.3 MG/DL (ref 8.6–10.5)
CHLORIDE SERPL-SCNC: 102 MMOL/L (ref 98–107)
CO2 SERPL-SCNC: 24 MMOL/L (ref 22–29)
CREAT SERPL-MCNC: 0.98 MG/DL (ref 0.76–1.27)
DEPRECATED RDW RBC AUTO: 41.7 FL (ref 37–54)
EGFRCR SERPLBLD CKD-EPI 2021: 89.9 ML/MIN/1.73
EOSINOPHIL # BLD AUTO: 0.05 10*3/MM3 (ref 0–0.4)
EOSINOPHIL NFR BLD AUTO: 0.9 % (ref 0.3–6.2)
ERYTHROCYTE [DISTWIDTH] IN BLOOD BY AUTOMATED COUNT: 12.8 % (ref 12.3–15.4)
GLOBULIN UR ELPH-MCNC: 1.6 GM/DL
GLUCOSE SERPL-MCNC: 91 MG/DL (ref 65–99)
HCT VFR BLD AUTO: 40.9 % (ref 37.5–51)
HGB BLD-MCNC: 13.9 G/DL (ref 13–17.7)
IMM GRANULOCYTES # BLD AUTO: 0.02 10*3/MM3 (ref 0–0.05)
IMM GRANULOCYTES NFR BLD AUTO: 0.4 % (ref 0–0.5)
LIPASE SERPL-CCNC: 24 U/L (ref 13–60)
LYMPHOCYTES # BLD AUTO: 1.74 10*3/MM3 (ref 0.7–3.1)
LYMPHOCYTES NFR BLD AUTO: 31 % (ref 19.6–45.3)
MAGNESIUM SERPL-MCNC: 2.1 MG/DL (ref 1.6–2.6)
MCH RBC QN AUTO: 30.2 PG (ref 26.6–33)
MCHC RBC AUTO-ENTMCNC: 34 G/DL (ref 31.5–35.7)
MCV RBC AUTO: 88.7 FL (ref 79–97)
MONOCYTES # BLD AUTO: 0.47 10*3/MM3 (ref 0.1–0.9)
MONOCYTES NFR BLD AUTO: 8.4 % (ref 5–12)
NEUTROPHILS NFR BLD AUTO: 3.32 10*3/MM3 (ref 1.7–7)
NEUTROPHILS NFR BLD AUTO: 58.9 % (ref 42.7–76)
NRBC BLD AUTO-RTO: 0 /100 WBC (ref 0–0.2)
PLATELET # BLD AUTO: 136 10*3/MM3 (ref 140–450)
PMV BLD AUTO: 9.9 FL (ref 6–12)
POTASSIUM SERPL-SCNC: 3.6 MMOL/L (ref 3.5–5.2)
PROT SERPL-MCNC: 6 G/DL (ref 6–8.5)
RBC # BLD AUTO: 4.61 10*6/MM3 (ref 4.14–5.8)
SODIUM SERPL-SCNC: 141 MMOL/L (ref 136–145)
TROPONIN T SERPL-MCNC: <0.01 NG/ML (ref 0–0.03)
WBC NRBC COR # BLD: 5.62 10*3/MM3 (ref 3.4–10.8)

## 2022-09-23 PROCEDURE — 85652 RBC SED RATE AUTOMATED: CPT | Performed by: STUDENT IN AN ORGANIZED HEALTH CARE EDUCATION/TRAINING PROGRAM

## 2022-09-23 PROCEDURE — G0378 HOSPITAL OBSERVATION PER HR: HCPCS

## 2022-09-23 PROCEDURE — 93005 ELECTROCARDIOGRAM TRACING: CPT

## 2022-09-23 PROCEDURE — 93010 ELECTROCARDIOGRAM REPORT: CPT | Performed by: INTERNAL MEDICINE

## 2022-09-23 PROCEDURE — 80053 COMPREHEN METABOLIC PANEL: CPT | Performed by: EMERGENCY MEDICINE

## 2022-09-23 PROCEDURE — 93005 ELECTROCARDIOGRAM TRACING: CPT | Performed by: EMERGENCY MEDICINE

## 2022-09-23 PROCEDURE — 85025 COMPLETE CBC W/AUTO DIFF WBC: CPT | Performed by: EMERGENCY MEDICINE

## 2022-09-23 PROCEDURE — 84484 ASSAY OF TROPONIN QUANT: CPT | Performed by: EMERGENCY MEDICINE

## 2022-09-23 PROCEDURE — 71045 X-RAY EXAM CHEST 1 VIEW: CPT

## 2022-09-23 PROCEDURE — 83735 ASSAY OF MAGNESIUM: CPT | Performed by: EMERGENCY MEDICINE

## 2022-09-23 PROCEDURE — 83690 ASSAY OF LIPASE: CPT | Performed by: EMERGENCY MEDICINE

## 2022-09-23 PROCEDURE — 99284 EMERGENCY DEPT VISIT MOD MDM: CPT

## 2022-09-23 RX ORDER — ASPIRIN 81 MG/1
162 TABLET, CHEWABLE ORAL ONCE
Status: COMPLETED | OUTPATIENT
Start: 2022-09-23 | End: 2022-09-23

## 2022-09-23 RX ORDER — SODIUM CHLORIDE 0.9 % (FLUSH) 0.9 %
10 SYRINGE (ML) INJECTION EVERY 12 HOURS SCHEDULED
Status: DISCONTINUED | OUTPATIENT
Start: 2022-09-23 | End: 2022-09-28 | Stop reason: HOSPADM

## 2022-09-23 RX ORDER — ONDANSETRON 4 MG/1
4 TABLET, FILM COATED ORAL EVERY 6 HOURS PRN
Status: DISCONTINUED | OUTPATIENT
Start: 2022-09-23 | End: 2022-09-28 | Stop reason: HOSPADM

## 2022-09-23 RX ORDER — SODIUM CHLORIDE 0.9 % (FLUSH) 0.9 %
10 SYRINGE (ML) INJECTION AS NEEDED
Status: DISCONTINUED | OUTPATIENT
Start: 2022-09-23 | End: 2022-09-28 | Stop reason: HOSPADM

## 2022-09-23 RX ORDER — ONDANSETRON 2 MG/ML
4 INJECTION INTRAMUSCULAR; INTRAVENOUS EVERY 6 HOURS PRN
Status: DISCONTINUED | OUTPATIENT
Start: 2022-09-23 | End: 2022-09-28 | Stop reason: HOSPADM

## 2022-09-23 RX ORDER — NITROGLYCERIN 0.4 MG/1
0.4 TABLET SUBLINGUAL
Status: DISCONTINUED | OUTPATIENT
Start: 2022-09-23 | End: 2022-09-28 | Stop reason: HOSPADM

## 2022-09-23 RX ORDER — CHOLECALCIFEROL (VITAMIN D3) 125 MCG
5 CAPSULE ORAL NIGHTLY PRN
Status: DISCONTINUED | OUTPATIENT
Start: 2022-09-23 | End: 2022-09-28 | Stop reason: HOSPADM

## 2022-09-23 RX ORDER — ACETAMINOPHEN 325 MG/1
650 TABLET ORAL EVERY 4 HOURS PRN
Status: DISCONTINUED | OUTPATIENT
Start: 2022-09-23 | End: 2022-09-28 | Stop reason: HOSPADM

## 2022-09-23 RX ADMIN — ASPIRIN 162 MG: 81 TABLET, CHEWABLE ORAL at 23:20

## 2022-09-24 ENCOUNTER — APPOINTMENT (OUTPATIENT)
Dept: CARDIOLOGY | Facility: HOSPITAL | Age: 57
End: 2022-09-24

## 2022-09-24 ENCOUNTER — ON CAMPUS - OUTPATIENT (OUTPATIENT)
Dept: URBAN - METROPOLITAN AREA HOSPITAL 114 | Facility: HOSPITAL | Age: 57
End: 2022-09-24

## 2022-09-24 ENCOUNTER — APPOINTMENT (OUTPATIENT)
Dept: NUCLEAR MEDICINE | Facility: HOSPITAL | Age: 57
End: 2022-09-24

## 2022-09-24 ENCOUNTER — APPOINTMENT (OUTPATIENT)
Dept: CT IMAGING | Facility: HOSPITAL | Age: 57
End: 2022-09-24

## 2022-09-24 DIAGNOSIS — R07.9 CHEST PAIN, UNSPECIFIED: ICD-10-CM

## 2022-09-24 DIAGNOSIS — R68.81 EARLY SATIETY: ICD-10-CM

## 2022-09-24 DIAGNOSIS — R63.0 ANOREXIA: ICD-10-CM

## 2022-09-24 DIAGNOSIS — R06.00 DYSPNEA, UNSPECIFIED: ICD-10-CM

## 2022-09-24 DIAGNOSIS — R63.4 ABNORMAL WEIGHT LOSS: ICD-10-CM

## 2022-09-24 LAB
ANION GAP SERPL CALCULATED.3IONS-SCNC: 11.4 MMOL/L (ref 5–15)
BH CV ECHO MEAS - ACS: 1.75 CM
BH CV ECHO MEAS - AO MAX PG: 6 MMHG
BH CV ECHO MEAS - AO MEAN PG: 2.8 MMHG
BH CV ECHO MEAS - AO ROOT DIAM: 3 CM
BH CV ECHO MEAS - AO V2 MAX: 122.2 CM/SEC
BH CV ECHO MEAS - AO V2 VTI: 22.4 CM
BH CV ECHO MEAS - AVA(I,D): 2.22 CM2
BH CV ECHO MEAS - EDV(CUBED): 61.8 ML
BH CV ECHO MEAS - EDV(MOD-SP2): 43 ML
BH CV ECHO MEAS - EDV(MOD-SP4): 42 ML
BH CV ECHO MEAS - EF(MOD-BP): 59 %
BH CV ECHO MEAS - EF(MOD-SP2): 60.5 %
BH CV ECHO MEAS - EF(MOD-SP4): 59.5 %
BH CV ECHO MEAS - ESV(CUBED): 19.9 ML
BH CV ECHO MEAS - ESV(MOD-SP2): 17 ML
BH CV ECHO MEAS - ESV(MOD-SP4): 17 ML
BH CV ECHO MEAS - FS: 31.5 %
BH CV ECHO MEAS - IVS/LVPW: 0.72 CM
BH CV ECHO MEAS - IVSD: 0.91 CM
BH CV ECHO MEAS - LA DIMENSION: 1.72 CM
BH CV ECHO MEAS - LV DIASTOLIC VOL/BSA (35-75): 21.5 CM2
BH CV ECHO MEAS - LV MASS(C)D: 140.8 GRAMS
BH CV ECHO MEAS - LV MAX PG: 2.6 MMHG
BH CV ECHO MEAS - LV MEAN PG: 1.31 MMHG
BH CV ECHO MEAS - LV SYSTOLIC VOL/BSA (12-30): 8.7 CM2
BH CV ECHO MEAS - LV V1 MAX: 80.6 CM/SEC
BH CV ECHO MEAS - LV V1 VTI: 14.4 CM
BH CV ECHO MEAS - LVIDD: 4 CM
BH CV ECHO MEAS - LVIDS: 2.7 CM
BH CV ECHO MEAS - LVOT AREA: 3.5 CM2
BH CV ECHO MEAS - LVOT DIAM: 2.1 CM
BH CV ECHO MEAS - LVPWD: 1.27 CM
BH CV ECHO MEAS - MV A MAX VEL: 50.6 CM/SEC
BH CV ECHO MEAS - MV DEC SLOPE: 237.4 CM/SEC2
BH CV ECHO MEAS - MV DEC TIME: 213 MSEC
BH CV ECHO MEAS - MV E MAX VEL: 49.3 CM/SEC
BH CV ECHO MEAS - MV E/A: 0.98
BH CV ECHO MEAS - MV MAX PG: 1.84 MMHG
BH CV ECHO MEAS - MV MEAN PG: 1.24 MMHG
BH CV ECHO MEAS - MV P1/2T: 90.4 MSEC
BH CV ECHO MEAS - MV V2 VTI: 22.4 CM
BH CV ECHO MEAS - MVA(P1/2T): 2.43 CM2
BH CV ECHO MEAS - MVA(VTI): 2.23 CM2
BH CV ECHO MEAS - PA ACC TIME: 0.16 SEC
BH CV ECHO MEAS - PA PR(ACCEL): 6.5 MMHG
BH CV ECHO MEAS - PA V2 MAX: 92.3 CM/SEC
BH CV ECHO MEAS - QP/QS: 0.87
BH CV ECHO MEAS - RAP SYSTOLE: 3 MMHG
BH CV ECHO MEAS - RV MAX PG: 1.7 MMHG
BH CV ECHO MEAS - RV V1 MAX: 65.1 CM/SEC
BH CV ECHO MEAS - RV V1 VTI: 11.7 CM
BH CV ECHO MEAS - RVOT DIAM: 2.18 CM
BH CV ECHO MEAS - RVSP: 23.1 MMHG
BH CV ECHO MEAS - SI(MOD-SP2): 13.3 ML/M2
BH CV ECHO MEAS - SI(MOD-SP4): 12.8 ML/M2
BH CV ECHO MEAS - SV(LVOT): 49.8 ML
BH CV ECHO MEAS - SV(MOD-SP2): 26 ML
BH CV ECHO MEAS - SV(MOD-SP4): 25 ML
BH CV ECHO MEAS - SV(RVOT): 43.4 ML
BH CV ECHO MEAS - TAPSE (>1.6): 2.5 CM
BH CV ECHO MEAS - TR MAX PG: 20.1 MMHG
BH CV ECHO MEAS - TR MAX VEL: 224 CM/SEC
BH CV NUCLEAR PRIOR STUDY: 3
BH CV REST NUCLEAR ISOTOPE DOSE: 10.8 MCI
BH CV STRESS COMMENTS STAGE 1: NORMAL
BH CV STRESS DOSE REGADENOSON STAGE 1: 0.4
BH CV STRESS DURATION MIN STAGE 1: 0
BH CV STRESS DURATION SEC STAGE 1: 10
BH CV STRESS NUCLEAR ISOTOPE DOSE: 31 MCI
BH CV STRESS PROTOCOL 1: NORMAL
BH CV STRESS RECOVERY BP: NORMAL MMHG
BH CV STRESS RECOVERY HR: 97 BPM
BH CV STRESS STAGE 1: 1
BH CV XLRA - RV BASE: 2.9 CM
BH CV XLRA - RV LENGTH: 5.3 CM
BH CV XLRA - RV MID: 2.43 CM
BUN SERPL-MCNC: 14 MG/DL (ref 6–20)
BUN/CREAT SERPL: 15.4 (ref 7–25)
CALCIUM SPEC-SCNC: 9.1 MG/DL (ref 8.6–10.5)
CHLORIDE SERPL-SCNC: 102 MMOL/L (ref 98–107)
CO2 SERPL-SCNC: 23.6 MMOL/L (ref 22–29)
CREAT SERPL-MCNC: 0.91 MG/DL (ref 0.76–1.27)
CRP SERPL-MCNC: <0.3 MG/DL (ref 0–0.5)
DEPRECATED RDW RBC AUTO: 41.9 FL (ref 37–54)
EGFRCR SERPLBLD CKD-EPI 2021: 98.3 ML/MIN/1.73
ERYTHROCYTE [DISTWIDTH] IN BLOOD BY AUTOMATED COUNT: 12.7 % (ref 12.3–15.4)
ERYTHROCYTE [SEDIMENTATION RATE] IN BLOOD: <1 MM/HR (ref 0–20)
GLUCOSE SERPL-MCNC: 90 MG/DL (ref 65–99)
HCT VFR BLD AUTO: 40.8 % (ref 37.5–51)
HGB BLD-MCNC: 13.5 G/DL (ref 13–17.7)
LV EF 2D ECHO EST: 59 %
MAXIMAL PREDICTED HEART RATE: 163 BPM
MAXIMAL PREDICTED HEART RATE: 163 BPM
MCH RBC QN AUTO: 29.7 PG (ref 26.6–33)
MCHC RBC AUTO-ENTMCNC: 33.1 G/DL (ref 31.5–35.7)
MCV RBC AUTO: 89.9 FL (ref 79–97)
PERCENT MAX PREDICTED HR: 68.71 %
PLATELET # BLD AUTO: 131 10*3/MM3 (ref 140–450)
PMV BLD AUTO: 9.9 FL (ref 6–12)
POTASSIUM SERPL-SCNC: 3.4 MMOL/L (ref 3.5–5.2)
RBC # BLD AUTO: 4.54 10*6/MM3 (ref 4.14–5.8)
SARS-COV-2 ORF1AB RESP QL NAA+PROBE: NOT DETECTED
SINUS: 2.9 CM
SODIUM SERPL-SCNC: 137 MMOL/L (ref 136–145)
STJ: 2.8 CM
STRESS BASELINE BP: NORMAL MMHG
STRESS BASELINE HR: 72 BPM
STRESS PERCENT HR: 81 %
STRESS POST PEAK BP: NORMAL MMHG
STRESS POST PEAK HR: 112 BPM
STRESS TARGET HR: 139 BPM
STRESS TARGET HR: 139 BPM
TROPONIN T SERPL-MCNC: <0.01 NG/ML (ref 0–0.03)
TROPONIN T SERPL-MCNC: <0.01 NG/ML (ref 0–0.03)
WBC NRBC COR # BLD: 5.11 10*3/MM3 (ref 3.4–10.8)

## 2022-09-24 PROCEDURE — 99203 OFFICE O/P NEW LOW 30 MIN: CPT | Performed by: INTERNAL MEDICINE

## 2022-09-24 PROCEDURE — G0378 HOSPITAL OBSERVATION PER HR: HCPCS

## 2022-09-24 PROCEDURE — A9500 TC99M SESTAMIBI: HCPCS | Performed by: EMERGENCY MEDICINE

## 2022-09-24 PROCEDURE — 93017 CV STRESS TEST TRACING ONLY: CPT

## 2022-09-24 PROCEDURE — 80048 BASIC METABOLIC PNL TOTAL CA: CPT | Performed by: STUDENT IN AN ORGANIZED HEALTH CARE EDUCATION/TRAINING PROGRAM

## 2022-09-24 PROCEDURE — 84484 ASSAY OF TROPONIN QUANT: CPT | Performed by: STUDENT IN AN ORGANIZED HEALTH CARE EDUCATION/TRAINING PROGRAM

## 2022-09-24 PROCEDURE — 93005 ELECTROCARDIOGRAM TRACING: CPT | Performed by: STUDENT IN AN ORGANIZED HEALTH CARE EDUCATION/TRAINING PROGRAM

## 2022-09-24 PROCEDURE — 93306 TTE W/DOPPLER COMPLETE: CPT

## 2022-09-24 PROCEDURE — 93010 ELECTROCARDIOGRAM REPORT: CPT | Performed by: INTERNAL MEDICINE

## 2022-09-24 PROCEDURE — 78452 HT MUSCLE IMAGE SPECT MULT: CPT | Performed by: INTERNAL MEDICINE

## 2022-09-24 PROCEDURE — 93018 CV STRESS TEST I&R ONLY: CPT | Performed by: INTERNAL MEDICINE

## 2022-09-24 PROCEDURE — 0 TECHNETIUM SESTAMIBI: Performed by: EMERGENCY MEDICINE

## 2022-09-24 PROCEDURE — 78452 HT MUSCLE IMAGE SPECT MULT: CPT

## 2022-09-24 PROCEDURE — 74176 CT ABD & PELVIS W/O CONTRAST: CPT

## 2022-09-24 PROCEDURE — 93005 ELECTROCARDIOGRAM TRACING: CPT | Performed by: INTERNAL MEDICINE

## 2022-09-24 PROCEDURE — 93016 CV STRESS TEST SUPVJ ONLY: CPT | Performed by: INTERNAL MEDICINE

## 2022-09-24 PROCEDURE — 85027 COMPLETE CBC AUTOMATED: CPT | Performed by: STUDENT IN AN ORGANIZED HEALTH CARE EDUCATION/TRAINING PROGRAM

## 2022-09-24 PROCEDURE — 93306 TTE W/DOPPLER COMPLETE: CPT | Performed by: INTERNAL MEDICINE

## 2022-09-24 PROCEDURE — 99204 OFFICE O/P NEW MOD 45 MIN: CPT | Performed by: INTERNAL MEDICINE

## 2022-09-24 PROCEDURE — U0004 COV-19 TEST NON-CDC HGH THRU: HCPCS | Performed by: INTERNAL MEDICINE

## 2022-09-24 PROCEDURE — 25010000002 REGADENOSON 0.4 MG/5ML SOLUTION: Performed by: EMERGENCY MEDICINE

## 2022-09-24 PROCEDURE — 86140 C-REACTIVE PROTEIN: CPT | Performed by: STUDENT IN AN ORGANIZED HEALTH CARE EDUCATION/TRAINING PROGRAM

## 2022-09-24 RX ORDER — POTASSIUM CHLORIDE 750 MG/1
30 TABLET, FILM COATED, EXTENDED RELEASE ORAL ONCE
Status: COMPLETED | OUTPATIENT
Start: 2022-09-24 | End: 2022-09-24

## 2022-09-24 RX ORDER — ASPIRIN 81 MG/1
81 TABLET ORAL DAILY
COMMUNITY

## 2022-09-24 RX ADMIN — Medication 10 ML: at 22:22

## 2022-09-24 RX ADMIN — POTASSIUM CHLORIDE 30 MEQ: 750 TABLET, EXTENDED RELEASE ORAL at 09:14

## 2022-09-24 RX ADMIN — TECHNETIUM TC 99M SESTAMIBI 1 DOSE: 1 INJECTION INTRAVENOUS at 11:20

## 2022-09-24 RX ADMIN — Medication 5 MG: at 22:22

## 2022-09-24 RX ADMIN — Medication 10 ML: at 00:00

## 2022-09-24 RX ADMIN — Medication 10 ML: at 09:15

## 2022-09-24 RX ADMIN — Medication 5 MG: at 01:19

## 2022-09-24 RX ADMIN — TECHNETIUM TC 99M SESTAMIBI 1 DOSE: 1 INJECTION INTRAVENOUS at 13:23

## 2022-09-24 RX ADMIN — REGADENOSON 0.4 MG: 0.08 INJECTION, SOLUTION INTRAVENOUS at 13:23

## 2022-09-25 LAB
ALBUMIN SERPL-MCNC: 4.1 G/DL (ref 3.5–5.2)
ALBUMIN/GLOB SERPL: 2.2 G/DL
ALP SERPL-CCNC: 48 U/L (ref 39–117)
ALT SERPL W P-5'-P-CCNC: 13 U/L (ref 1–41)
ANION GAP SERPL CALCULATED.3IONS-SCNC: 9.9 MMOL/L (ref 5–15)
AST SERPL-CCNC: 18 U/L (ref 1–40)
BASOPHILS # BLD AUTO: 0.02 10*3/MM3 (ref 0–0.2)
BASOPHILS NFR BLD AUTO: 0.4 % (ref 0–1.5)
BILIRUB SERPL-MCNC: 0.9 MG/DL (ref 0–1.2)
BUN SERPL-MCNC: 14 MG/DL (ref 6–20)
BUN/CREAT SERPL: 15.4 (ref 7–25)
CALCIUM SPEC-SCNC: 9.1 MG/DL (ref 8.6–10.5)
CHLORIDE SERPL-SCNC: 102 MMOL/L (ref 98–107)
CO2 SERPL-SCNC: 24.1 MMOL/L (ref 22–29)
CREAT SERPL-MCNC: 0.91 MG/DL (ref 0.76–1.27)
DEPRECATED RDW RBC AUTO: 41.5 FL (ref 37–54)
EGFRCR SERPLBLD CKD-EPI 2021: 98.3 ML/MIN/1.73
EOSINOPHIL # BLD AUTO: 0.06 10*3/MM3 (ref 0–0.4)
EOSINOPHIL NFR BLD AUTO: 1.2 % (ref 0.3–6.2)
ERYTHROCYTE [DISTWIDTH] IN BLOOD BY AUTOMATED COUNT: 12.6 % (ref 12.3–15.4)
GLOBULIN UR ELPH-MCNC: 1.9 GM/DL
GLUCOSE SERPL-MCNC: 92 MG/DL (ref 65–99)
HCT VFR BLD AUTO: 43.1 % (ref 37.5–51)
HGB BLD-MCNC: 14.7 G/DL (ref 13–17.7)
IMM GRANULOCYTES # BLD AUTO: 0.01 10*3/MM3 (ref 0–0.05)
IMM GRANULOCYTES NFR BLD AUTO: 0.2 % (ref 0–0.5)
LYMPHOCYTES # BLD AUTO: 1.65 10*3/MM3 (ref 0.7–3.1)
LYMPHOCYTES NFR BLD AUTO: 33 % (ref 19.6–45.3)
MCH RBC QN AUTO: 30.2 PG (ref 26.6–33)
MCHC RBC AUTO-ENTMCNC: 34.1 G/DL (ref 31.5–35.7)
MCV RBC AUTO: 88.5 FL (ref 79–97)
MONOCYTES # BLD AUTO: 0.43 10*3/MM3 (ref 0.1–0.9)
MONOCYTES NFR BLD AUTO: 8.6 % (ref 5–12)
NEUTROPHILS NFR BLD AUTO: 2.83 10*3/MM3 (ref 1.7–7)
NEUTROPHILS NFR BLD AUTO: 56.6 % (ref 42.7–76)
NRBC BLD AUTO-RTO: 0 /100 WBC (ref 0–0.2)
PLATELET # BLD AUTO: 132 10*3/MM3 (ref 140–450)
PMV BLD AUTO: 10.4 FL (ref 6–12)
POTASSIUM SERPL-SCNC: 4.1 MMOL/L (ref 3.5–5.2)
PROT SERPL-MCNC: 6 G/DL (ref 6–8.5)
QT INTERVAL: 385 MS
QT INTERVAL: 411 MS
QT INTERVAL: 421 MS
RBC # BLD AUTO: 4.87 10*6/MM3 (ref 4.14–5.8)
SODIUM SERPL-SCNC: 136 MMOL/L (ref 136–145)
WBC NRBC COR # BLD: 5 10*3/MM3 (ref 3.4–10.8)

## 2022-09-25 PROCEDURE — G0378 HOSPITAL OBSERVATION PER HR: HCPCS

## 2022-09-25 PROCEDURE — 99214 OFFICE O/P EST MOD 30 MIN: CPT | Performed by: INTERNAL MEDICINE

## 2022-09-25 PROCEDURE — 80053 COMPREHEN METABOLIC PANEL: CPT | Performed by: INTERNAL MEDICINE

## 2022-09-25 PROCEDURE — 85025 COMPLETE CBC W/AUTO DIFF WBC: CPT | Performed by: INTERNAL MEDICINE

## 2022-09-25 PROCEDURE — 80061 LIPID PANEL: CPT | Performed by: INTERNAL MEDICINE

## 2022-09-25 RX ORDER — ALLOPURINOL 300 MG/1
300 TABLET ORAL DAILY
Status: DISCONTINUED | OUTPATIENT
Start: 2022-09-25 | End: 2022-09-28 | Stop reason: HOSPADM

## 2022-09-25 RX ORDER — ASPIRIN 81 MG/1
81 TABLET ORAL DAILY
Status: DISCONTINUED | OUTPATIENT
Start: 2022-09-25 | End: 2022-09-26

## 2022-09-25 RX ORDER — PANTOPRAZOLE SODIUM 40 MG/1
40 TABLET, DELAYED RELEASE ORAL NIGHTLY
Status: DISCONTINUED | OUTPATIENT
Start: 2022-09-25 | End: 2022-09-28 | Stop reason: HOSPADM

## 2022-09-25 RX ORDER — POLYETHYLENE GLYCOL 3350 17 G/17G
17 POWDER, FOR SOLUTION ORAL DAILY
Status: DISCONTINUED | OUTPATIENT
Start: 2022-09-25 | End: 2022-09-26 | Stop reason: SDUPTHER

## 2022-09-25 RX ORDER — ATORVASTATIN CALCIUM 20 MG/1
20 TABLET, FILM COATED ORAL NIGHTLY
Status: DISCONTINUED | OUTPATIENT
Start: 2022-09-25 | End: 2022-09-28 | Stop reason: HOSPADM

## 2022-09-25 RX ADMIN — Medication 5 MG: at 23:24

## 2022-09-25 RX ADMIN — ATORVASTATIN CALCIUM 20 MG: 20 TABLET, FILM COATED ORAL at 21:11

## 2022-09-25 RX ADMIN — PANTOPRAZOLE SODIUM 40 MG: 40 TABLET, DELAYED RELEASE ORAL at 21:11

## 2022-09-25 RX ADMIN — ALLOPURINOL 300 MG: 300 TABLET ORAL at 17:12

## 2022-09-25 RX ADMIN — Medication 10 ML: at 08:57

## 2022-09-25 RX ADMIN — Medication 10 ML: at 21:11

## 2022-09-25 RX ADMIN — ASPIRIN 81 MG: 81 TABLET, FILM COATED ORAL at 17:12

## 2022-09-26 LAB
CHOLEST SERPL-MCNC: 138 MG/DL (ref 0–200)
HDLC SERPL-MCNC: 65 MG/DL (ref 40–60)
LDLC SERPL CALC-MCNC: 54 MG/DL (ref 0–100)
LDLC/HDLC SERPL: 0.8 {RATIO}
TRIGL SERPL-MCNC: 106 MG/DL (ref 0–150)
VLDLC SERPL-MCNC: 19 MG/DL (ref 5–40)

## 2022-09-26 PROCEDURE — 99204 OFFICE O/P NEW MOD 45 MIN: CPT | Performed by: INTERNAL MEDICINE

## 2022-09-26 PROCEDURE — 93458 L HRT ARTERY/VENTRICLE ANGIO: CPT | Performed by: INTERNAL MEDICINE

## 2022-09-26 PROCEDURE — G0378 HOSPITAL OBSERVATION PER HR: HCPCS

## 2022-09-26 PROCEDURE — 25010000002 MIDAZOLAM PER 1 MG: Performed by: INTERNAL MEDICINE

## 2022-09-26 PROCEDURE — C1769 GUIDE WIRE: HCPCS | Performed by: INTERNAL MEDICINE

## 2022-09-26 PROCEDURE — 25010000002 HEPARIN (PORCINE) PER 1000 UNITS: Performed by: INTERNAL MEDICINE

## 2022-09-26 PROCEDURE — 0 IOPAMIDOL PER 1 ML: Performed by: INTERNAL MEDICINE

## 2022-09-26 PROCEDURE — C1894 INTRO/SHEATH, NON-LASER: HCPCS | Performed by: INTERNAL MEDICINE

## 2022-09-26 PROCEDURE — 25010000002 FENTANYL CITRATE (PF) 50 MCG/ML SOLUTION: Performed by: INTERNAL MEDICINE

## 2022-09-26 RX ORDER — FENTANYL CITRATE 50 UG/ML
INJECTION, SOLUTION INTRAMUSCULAR; INTRAVENOUS AS NEEDED
Status: DISCONTINUED | OUTPATIENT
Start: 2022-09-26 | End: 2022-09-26 | Stop reason: HOSPADM

## 2022-09-26 RX ORDER — MIDAZOLAM HYDROCHLORIDE 1 MG/ML
INJECTION INTRAMUSCULAR; INTRAVENOUS AS NEEDED
Status: DISCONTINUED | OUTPATIENT
Start: 2022-09-26 | End: 2022-09-26 | Stop reason: HOSPADM

## 2022-09-26 RX ORDER — MORPHINE SULFATE 2 MG/ML
1 INJECTION, SOLUTION INTRAMUSCULAR; INTRAVENOUS EVERY 4 HOURS PRN
Status: DISCONTINUED | OUTPATIENT
Start: 2022-09-26 | End: 2022-09-28 | Stop reason: HOSPADM

## 2022-09-26 RX ORDER — SODIUM CHLORIDE 9 MG/ML
INJECTION, SOLUTION INTRAVENOUS CONTINUOUS PRN
Status: COMPLETED | OUTPATIENT
Start: 2022-09-26 | End: 2022-09-26

## 2022-09-26 RX ORDER — NALOXONE HCL 0.4 MG/ML
0.4 VIAL (ML) INJECTION
Status: DISCONTINUED | OUTPATIENT
Start: 2022-09-26 | End: 2022-09-28 | Stop reason: HOSPADM

## 2022-09-26 RX ORDER — LIDOCAINE HYDROCHLORIDE 20 MG/ML
INJECTION, SOLUTION INFILTRATION; PERINEURAL AS NEEDED
Status: DISCONTINUED | OUTPATIENT
Start: 2022-09-26 | End: 2022-09-26 | Stop reason: HOSPADM

## 2022-09-26 RX ORDER — SODIUM CHLORIDE 9 MG/ML
125 INJECTION, SOLUTION INTRAVENOUS CONTINUOUS
Status: DISCONTINUED | OUTPATIENT
Start: 2022-09-26 | End: 2022-09-26

## 2022-09-26 RX ORDER — HYDROCODONE BITARTRATE AND ACETAMINOPHEN 5; 325 MG/1; MG/1
1 TABLET ORAL EVERY 4 HOURS PRN
Status: DISCONTINUED | OUTPATIENT
Start: 2022-09-26 | End: 2022-09-28 | Stop reason: HOSPADM

## 2022-09-26 RX ORDER — POLYETHYLENE GLYCOL 3350 17 G/17G
0.5 POWDER, FOR SOLUTION ORAL EVERY 12 HOURS
Status: COMPLETED | OUTPATIENT
Start: 2022-09-26 | End: 2022-09-27

## 2022-09-26 RX ADMIN — POLYETHYLENE GLYCOL 3350 0.5 BOTTLE: 17 POWDER, FOR SOLUTION ORAL at 17:36

## 2022-09-26 RX ADMIN — ASPIRIN 81 MG: 81 TABLET, FILM COATED ORAL at 09:23

## 2022-09-26 RX ADMIN — PANTOPRAZOLE SODIUM 40 MG: 40 TABLET, DELAYED RELEASE ORAL at 21:40

## 2022-09-26 RX ADMIN — ALLOPURINOL 300 MG: 300 TABLET ORAL at 09:23

## 2022-09-26 RX ADMIN — SODIUM CHLORIDE 125 ML/HR: 9 INJECTION, SOLUTION INTRAVENOUS at 09:20

## 2022-09-26 RX ADMIN — ATORVASTATIN CALCIUM 20 MG: 20 TABLET, FILM COATED ORAL at 21:41

## 2022-09-26 RX ADMIN — Medication 10 ML: at 21:43

## 2022-09-27 ENCOUNTER — ANESTHESIA EVENT (OUTPATIENT)
Dept: GASTROENTEROLOGY | Facility: HOSPITAL | Age: 57
End: 2022-09-27

## 2022-09-27 ENCOUNTER — ANESTHESIA (OUTPATIENT)
Dept: GASTROENTEROLOGY | Facility: HOSPITAL | Age: 57
End: 2022-09-27

## 2022-09-27 PROBLEM — K64.8 INTERNAL HEMORRHOIDS: Status: ACTIVE | Noted: 2022-09-27

## 2022-09-27 PROBLEM — D69.6 THROMBOCYTOPENIA (HCC): Status: ACTIVE | Noted: 2022-09-27

## 2022-09-27 PROBLEM — K62.1 RECTAL POLYP: Status: ACTIVE | Noted: 2022-09-27

## 2022-09-27 LAB
ALBUMIN SERPL-MCNC: 3.8 G/DL (ref 3.5–5.2)
ALBUMIN/GLOB SERPL: 2.1 G/DL
ALP SERPL-CCNC: 42 U/L (ref 39–117)
ALT SERPL W P-5'-P-CCNC: 21 U/L (ref 1–41)
ANION GAP SERPL CALCULATED.3IONS-SCNC: 12 MMOL/L (ref 5–15)
AST SERPL-CCNC: 28 U/L (ref 1–40)
BASOPHILS # BLD AUTO: 0.03 10*3/MM3 (ref 0–0.2)
BASOPHILS NFR BLD AUTO: 0.6 % (ref 0–1.5)
BILIRUB SERPL-MCNC: 0.6 MG/DL (ref 0–1.2)
BUN SERPL-MCNC: 11 MG/DL (ref 6–20)
BUN/CREAT SERPL: 12.4 (ref 7–25)
CALCIUM SPEC-SCNC: 8.9 MG/DL (ref 8.6–10.5)
CHLORIDE SERPL-SCNC: 105 MMOL/L (ref 98–107)
CO2 SERPL-SCNC: 23 MMOL/L (ref 22–29)
CREAT SERPL-MCNC: 0.89 MG/DL (ref 0.76–1.27)
DEPRECATED RDW RBC AUTO: 43.3 FL (ref 37–54)
EGFRCR SERPLBLD CKD-EPI 2021: 100 ML/MIN/1.73
EOSINOPHIL # BLD AUTO: 0.06 10*3/MM3 (ref 0–0.4)
EOSINOPHIL NFR BLD AUTO: 1.2 % (ref 0.3–6.2)
ERYTHROCYTE [DISTWIDTH] IN BLOOD BY AUTOMATED COUNT: 12.7 % (ref 12.3–15.4)
GLOBULIN UR ELPH-MCNC: 1.8 GM/DL
GLUCOSE SERPL-MCNC: 98 MG/DL (ref 65–99)
HCT VFR BLD AUTO: 44.5 % (ref 37.5–51)
HGB BLD-MCNC: 14.1 G/DL (ref 13–17.7)
INR PPP: 1.06 (ref 0.9–1.1)
LYMPHOCYTES # BLD AUTO: 1.49 10*3/MM3 (ref 0.7–3.1)
LYMPHOCYTES NFR BLD AUTO: 28.9 % (ref 19.6–45.3)
MCH RBC QN AUTO: 29.3 PG (ref 26.6–33)
MCHC RBC AUTO-ENTMCNC: 31.7 G/DL (ref 31.5–35.7)
MCV RBC AUTO: 92.5 FL (ref 79–97)
MONOCYTES # BLD AUTO: 0.41 10*3/MM3 (ref 0.1–0.9)
MONOCYTES NFR BLD AUTO: 7.9 % (ref 5–12)
NEUTROPHILS NFR BLD AUTO: 3.15 10*3/MM3 (ref 1.7–7)
NEUTROPHILS NFR BLD AUTO: 61 % (ref 42.7–76)
PLATELET # BLD AUTO: 120 10*3/MM3 (ref 140–450)
PMV BLD AUTO: 10.2 FL (ref 6–12)
POTASSIUM SERPL-SCNC: 3.7 MMOL/L (ref 3.5–5.2)
PROT SERPL-MCNC: 5.6 G/DL (ref 6–8.5)
PROTHROMBIN TIME: 13.7 SECONDS (ref 11.7–14.2)
RBC # BLD AUTO: 4.81 10*6/MM3 (ref 4.14–5.8)
SODIUM SERPL-SCNC: 140 MMOL/L (ref 136–145)
WBC NRBC COR # BLD: 5.16 10*3/MM3 (ref 3.4–10.8)

## 2022-09-27 PROCEDURE — G0378 HOSPITAL OBSERVATION PER HR: HCPCS

## 2022-09-27 PROCEDURE — 88305 TISSUE EXAM BY PATHOLOGIST: CPT | Performed by: INTERNAL MEDICINE

## 2022-09-27 PROCEDURE — 43239 EGD BIOPSY SINGLE/MULTIPLE: CPT | Performed by: INTERNAL MEDICINE

## 2022-09-27 PROCEDURE — 80053 COMPREHEN METABOLIC PANEL: CPT | Performed by: INTERNAL MEDICINE

## 2022-09-27 PROCEDURE — 85025 COMPLETE CBC W/AUTO DIFF WBC: CPT | Performed by: INTERNAL MEDICINE

## 2022-09-27 PROCEDURE — 88313 SPECIAL STAINS GROUP 2: CPT | Performed by: INTERNAL MEDICINE

## 2022-09-27 PROCEDURE — 45385 COLONOSCOPY W/LESION REMOVAL: CPT | Performed by: INTERNAL MEDICINE

## 2022-09-27 PROCEDURE — 85610 PROTHROMBIN TIME: CPT | Performed by: INTERNAL MEDICINE

## 2022-09-27 PROCEDURE — 25010000002 PROPOFOL 10 MG/ML EMULSION: Performed by: ANESTHESIOLOGY

## 2022-09-27 RX ORDER — SODIUM CHLORIDE, SODIUM LACTATE, POTASSIUM CHLORIDE, CALCIUM CHLORIDE 600; 310; 30; 20 MG/100ML; MG/100ML; MG/100ML; MG/100ML
INJECTION, SOLUTION INTRAVENOUS CONTINUOUS PRN
Status: DISCONTINUED | OUTPATIENT
Start: 2022-09-27 | End: 2022-09-27

## 2022-09-27 RX ORDER — LIDOCAINE HYDROCHLORIDE 20 MG/ML
INJECTION, SOLUTION INFILTRATION; PERINEURAL AS NEEDED
Status: DISCONTINUED | OUTPATIENT
Start: 2022-09-27 | End: 2022-09-27 | Stop reason: SURG

## 2022-09-27 RX ORDER — PROPOFOL 10 MG/ML
VIAL (ML) INTRAVENOUS AS NEEDED
Status: DISCONTINUED | OUTPATIENT
Start: 2022-09-27 | End: 2022-09-27 | Stop reason: SURG

## 2022-09-27 RX ORDER — SODIUM CHLORIDE 9 MG/ML
30 INJECTION, SOLUTION INTRAVENOUS CONTINUOUS PRN
Status: DISCONTINUED | OUTPATIENT
Start: 2022-09-27 | End: 2022-09-28 | Stop reason: HOSPADM

## 2022-09-27 RX ORDER — PROPOFOL 10 MG/ML
VIAL (ML) INTRAVENOUS CONTINUOUS PRN
Status: DISCONTINUED | OUTPATIENT
Start: 2022-09-27 | End: 2022-09-27 | Stop reason: SURG

## 2022-09-27 RX ORDER — ASPIRIN 81 MG/1
81 TABLET ORAL DAILY
Status: DISCONTINUED | OUTPATIENT
Start: 2022-09-27 | End: 2022-09-28 | Stop reason: HOSPADM

## 2022-09-27 RX ADMIN — Medication 5 MG: at 22:42

## 2022-09-27 RX ADMIN — ALLOPURINOL 300 MG: 300 TABLET ORAL at 08:22

## 2022-09-27 RX ADMIN — LIDOCAINE HYDROCHLORIDE 20 MG: 20 INJECTION, SOLUTION INFILTRATION; PERINEURAL at 12:28

## 2022-09-27 RX ADMIN — SODIUM CHLORIDE 30 ML/HR: 9 INJECTION, SOLUTION INTRAVENOUS at 10:47

## 2022-09-27 RX ADMIN — PROPOFOL 120 MG: 10 INJECTION, EMULSION INTRAVENOUS at 12:28

## 2022-09-27 RX ADMIN — POLYETHYLENE GLYCOL 3350 0.5 BOTTLE: 17 POWDER, FOR SOLUTION ORAL at 06:08

## 2022-09-27 RX ADMIN — Medication 5 MG: at 00:42

## 2022-09-27 RX ADMIN — Medication 10 ML: at 22:43

## 2022-09-27 RX ADMIN — ATORVASTATIN CALCIUM 20 MG: 20 TABLET, FILM COATED ORAL at 21:05

## 2022-09-27 RX ADMIN — PANTOPRAZOLE SODIUM 40 MG: 40 TABLET, DELAYED RELEASE ORAL at 21:05

## 2022-09-27 RX ADMIN — Medication 10 ML: at 08:22

## 2022-09-27 RX ADMIN — ASPIRIN 81 MG: 81 TABLET, COATED ORAL at 21:04

## 2022-09-27 RX ADMIN — Medication 160 MCG/KG/MIN: at 12:30

## 2022-09-27 NOTE — ANESTHESIA PREPROCEDURE EVALUATION
Anesthesia Evaluation     no history of anesthetic complications:               Airway   Mallampati: I  TM distance: >3 FB  Neck ROM: full  Dental - normal exam     Pulmonary    (-) shortness of breath, recent URI    ROS comment: Chest pain , cath nl  Cardiovascular     (+) CAD, angina, hyperlipidemia,   (-) dysrhythmias      Neuro/Psych  GI/Hepatic/Renal/Endo    (+)   liver disease fatty liver disease,     Musculoskeletal     Abdominal    Substance History      OB/GYN          Other                        Anesthesia Plan    ASA 3     MAC     intravenous induction     Anesthetic plan, risks, benefits, and alternatives have been provided, discussed and informed consent has been obtained with: patient.        CODE STATUS:    Code Status (Patient has no pulse and is not breathing): CPR (Attempt to Resuscitate)  Medical Interventions (Patient has pulse or is breathing): Full Support

## 2022-09-27 NOTE — ANESTHESIA POSTPROCEDURE EVALUATION
"Patient: Dago Zambrano    Procedure Summary     Date: 09/27/22 [facility]    Anesthesia Start: 1222 Anesthesia Stop: 1254    Procedures:       ESOPHAGOGASTRODUODENOSCOPY with bx (N/A Esophagus)      COLONOSCOPY to cecum/ terminal ileum with cold snare polypectomy (N/A ) Diagnosis:       Angina pectoris with documented spasm (HCC)      (Angina pectoris with documented spasm (HCC) [I20.1])    Surgeons: Nathaniel Lomax MD Provider: Ki Yeager MD    Anesthesia Type: MAC ASA Status: 3          Anesthesia Type: MAC    Vitals  Vitals Value Taken Time   /93 09/27/22 1302   Temp     Pulse 74 09/27/22 1302   Resp 16 09/27/22 1302   SpO2 100 % 09/27/22 1302           Post Anesthesia Care and Evaluation    Patient location during evaluation: bedside  Patient participation: complete - patient participated  Level of consciousness: awake  Pain management: adequate    Airway patency: patent  Anesthetic complications: No anesthetic complications    Cardiovascular status: acceptable  Respiratory status: acceptable  Hydration status: acceptable    Comments: */86 (BP Location: Left arm, Patient Position: Lying)   Pulse 61   Temp 36.9 °C (98.4 °F) (Oral)   Resp 16   Ht 185.4 cm (73\")   Wt 70.8 kg (156 lb 1.6 oz)   SpO2 100%   BMI 20.59 kg/m²         "

## 2022-09-28 ENCOUNTER — READMISSION MANAGEMENT (OUTPATIENT)
Dept: CALL CENTER | Facility: HOSPITAL | Age: 57
End: 2022-09-28

## 2022-09-28 VITALS
WEIGHT: 156.1 LBS | RESPIRATION RATE: 16 BRPM | OXYGEN SATURATION: 95 % | BODY MASS INDEX: 20.69 KG/M2 | HEART RATE: 61 BPM | TEMPERATURE: 97.4 F | SYSTOLIC BLOOD PRESSURE: 109 MMHG | DIASTOLIC BLOOD PRESSURE: 78 MMHG | HEIGHT: 73 IN

## 2022-09-28 PROBLEM — R94.39 ABNORMAL STRESS TEST: Status: ACTIVE | Noted: 2022-09-28

## 2022-09-28 PROBLEM — R07.9 CHEST PAIN: Status: RESOLVED | Noted: 2022-09-23 | Resolved: 2022-09-28

## 2022-09-28 PROBLEM — R07.9 CHEST PAIN, UNSPECIFIED TYPE: Status: RESOLVED | Noted: 2022-09-24 | Resolved: 2022-09-28

## 2022-09-28 PROCEDURE — G0378 HOSPITAL OBSERVATION PER HR: HCPCS

## 2022-09-28 PROCEDURE — 99214 OFFICE O/P EST MOD 30 MIN: CPT | Performed by: INTERNAL MEDICINE

## 2022-09-28 RX ORDER — ACETAMINOPHEN 325 MG/1
650 TABLET ORAL EVERY 6 HOURS PRN
Start: 2022-09-28

## 2022-09-28 RX ADMIN — ALLOPURINOL 300 MG: 300 TABLET ORAL at 09:07

## 2022-09-28 RX ADMIN — ASPIRIN 81 MG: 81 TABLET, COATED ORAL at 09:07

## 2022-09-28 NOTE — OUTREACH NOTE
Prep Survey    Flowsheet Row Responses   [facility] patient discharged from? [place]   Is LACE score < 7 ? Yes   Emergency Room discharge w/ pulse ox? No   Eligibility [facility]   Date of Admission 09/23/22   Date of Discharge 09/28/22   Discharge Disposition Home or Self Care   Discharge diagnosis Chest pain,    CAD,    heart catheterization    Does the patient have one of the following disease processes/diagnoses(primary or secondary)? Other   Does the patient have Home health ordered? No   Is there a DME ordered? No   Prep survey completed? Yes          DEJUAN HERNANDEZ - Registered Nurse

## 2022-09-29 ENCOUNTER — TRANSITIONAL CARE MANAGEMENT TELEPHONE ENCOUNTER (OUTPATIENT)
Dept: CALL CENTER | Facility: HOSPITAL | Age: 57
End: 2022-09-29

## 2022-09-29 ENCOUNTER — TELEPHONE (OUTPATIENT)
Dept: FAMILY MEDICINE CLINIC | Facility: CLINIC | Age: 57
End: 2022-09-29

## 2022-09-29 ENCOUNTER — TELEPHONE (OUTPATIENT)
Dept: GASTROENTEROLOGY | Facility: CLINIC | Age: 57
End: 2022-09-29

## 2022-09-29 LAB
LAB AP CASE REPORT: NORMAL
LAB AP SPECIAL STAINS: NORMAL
PATH REPORT.FINAL DX SPEC: NORMAL
PATH REPORT.GROSS SPEC: NORMAL

## 2022-09-29 NOTE — OUTREACH NOTE
Call Center TCM Note    Flowsheet Row Responses   [facility] patient discharged from? [place]   Does the patient have one of the following disease processes/diagnoses(primary or secondary)? Other   TCM attempt successful? Yes   Discharge diagnosis Chest pain,    CAD,    heart catheterization    Meds reviewed with patient/caregiver? Yes   Does the patient have all medications ordered at discharge? N/A   Comments JOSS SALAZAR 10/06, cardio md 10/05, TCM APPT with PCP 10/12, which fyi to PCP ofc is one day out of TCM  TIME FRAME if ofc can r/s to sooner date.    Has home health visited the patient within 72 hours of discharge? N/A   Psychosocial issues? No   Did the patient receive a copy of their discharge instructions? Yes   Nursing interventions Reviewed instructions with patient   What is the patient's perception of their health status since discharge? Improving   Is the patient/caregiver able to teach back signs and symptoms related to disease process for when to call PCP? Yes   Is the patient/caregiver able to teach back signs and symptoms related to disease process for when to call 911? Yes   Is the patient/caregiver able to teach back the hierarchy of who to call/visit for symptoms/problems? PCP, Specialist, Home health nurse, Urgent Care, ED, 911 Yes   If the patient is a current smoker, are they able to teach back resources for cessation? Not a smoker   TCM call completed? Yes   Wrap up additional comments D/C DX: Chest pain,  CAD,  heart catheterization ** Pt tired still not feeling well, but better than before admit. Pt has several follow ups, JOSS SALAZAR 10/06, cardio md 10/05, TCM APPT with PCP 10/12, which fyi to PCP ofc is one day out of TCM  TIME FRAME if ofc can r/s to sooner date.           Ana Contreras MA    9/29/2022, 16:17 EDT

## 2022-09-29 NOTE — OUTREACH NOTE
Call Center TCM Note    Flowsheet Row Responses   [facility] patient discharged from? [place]   Does the patient have one of the following disease processes/diagnoses(primary or secondary)? Other   TCM attempt successful? No   Unsuccessful attempts Attempt 1          Ana Contreras MA    9/29/2022, 13:39 EDT

## 2022-09-29 NOTE — TELEPHONE ENCOUNTER
PT CALLING AGAIN, ADVISED REQUEST FOR CALL BACK WAS PUT IN AND TO PLEASE GIVE MINIMUM 24 HOURS AS WE ARE SHORT STAFFED AND HE MAY NOT GET CALL BACK UNTIL TOMORROW. PT SAID HE WILL ALSO WAIT UNTIL 6PM TODAY TO TAKE SHOWER IN CASE HE GETS CALL BACK TODAY SO HE WILL BE AVAILABLE.

## 2022-09-29 NOTE — TELEPHONE ENCOUNTER
Caller: Dago Zambrano    Relationship: Self    Best call back number: [phone] OK TO LEAVE MESSAGE    Do you require a callback: YES-PATIENT WAS JUST IN THE HOSPITAL AND HAD A COLONOSCOPY AND ENDOSCOPY. THEY ALSO DID A CT OF PELVIS. HE WANTS TO KNOW IF HE STILL NEEDS TO DO THE ULTRASOUND ON HIS ABDOMEN ON 10/06. PLEASE CALL AND ADVISE.

## 2022-09-29 NOTE — TELEPHONE ENCOUNTER
Hub staff attempted to follow warm transfer process and was unsuccessful     Caller: BC SMITH    Relationship to patient: SELF    Best call back number: [phone]    Patient is needing: PT IS CALLING TO SEE IF HE CAN POSSIBLY BE SEEN ON 10/5 INSTEAD OF 10/6. HE HAS ANOTHER APPT 10/5 AT 10:30 AND SAID IT WOULD MAKE IT A LOT EASIER ON HIM IF HE COULD BE SEEN ON 10/5 AND GET BOTH APPTS DONE THAT DAY. PLEASE CALL PT BACK TO ADVISE ASAP SO THAT HE CAN MAKE OTHER ARRANGEMENTS IF NOT. THANK YOU.     PT ALSO EXPRESSED HIS GRATITUDE AND APPRECIATION FOR DR. SANTOS AND STAFF, SAID EXPERIENCE HAS BEEN FIRST CLASS IN ALL REGARDS AND HE IS VERY THANKFUL.

## 2022-09-30 ENCOUNTER — TELEPHONE (OUTPATIENT)
Dept: GASTROENTEROLOGY | Facility: CLINIC | Age: 57
End: 2022-09-30

## 2022-09-30 NOTE — TELEPHONE ENCOUNTER
Patient notified of results and recommendations and verbalized understanding    Cs recall updated 9/27/32    For now he will keep his 10/6 apt, he will cancel if he continue to be pain free.

## 2022-09-30 NOTE — TELEPHONE ENCOUNTER
----- Message from Nathaniel Lomax MD sent at 9/29/2022 10:10 AM EDT -----  Pathology benign  Change colonoscopy recall to 10 years  Follow-up with Juan Treadwell, PCP  He can see me on October 6 already scheduled if he wants or he can cancel

## 2022-10-03 ENCOUNTER — TELEPHONE (OUTPATIENT)
Dept: FAMILY MEDICINE CLINIC | Facility: CLINIC | Age: 57
End: 2022-10-03

## 2022-10-03 NOTE — TELEPHONE ENCOUNTER
Caller: Dago Zambrano    Relationship to patient: Self    Best call back number: [phone]    Patient is needing: PATIENT WAS IN THE HOSPITAL AND STATES THAT THEY DID SOME LAB WORK, BUT ISNT SURE IF HE STILL NEEDS TO GO  AND DO THE LABS FOR DR. HINTON. PLEASE REACH OUT AND ADVISE.

## 2022-10-05 ENCOUNTER — OFFICE VISIT (OUTPATIENT)
Dept: CARDIOLOGY | Facility: CLINIC | Age: 57
End: 2022-10-05

## 2022-10-05 VITALS
WEIGHT: 162.4 LBS | DIASTOLIC BLOOD PRESSURE: 76 MMHG | HEART RATE: 85 BPM | BODY MASS INDEX: 21.52 KG/M2 | SYSTOLIC BLOOD PRESSURE: 100 MMHG | HEIGHT: 73 IN

## 2022-10-05 DIAGNOSIS — Z87.898 HISTORY OF CHEST PAIN: ICD-10-CM

## 2022-10-05 DIAGNOSIS — I25.10 NONOBSTRUCTIVE ATHEROSCLEROSIS OF CORONARY ARTERY: Primary | ICD-10-CM

## 2022-10-05 PROCEDURE — 99214 OFFICE O/P EST MOD 30 MIN: CPT | Performed by: NURSE PRACTITIONER

## 2022-10-05 PROCEDURE — 93000 ELECTROCARDIOGRAM COMPLETE: CPT | Performed by: NURSE PRACTITIONER

## 2022-10-05 NOTE — PROGRESS NOTES
Date of Office Visit: 10/05/2022  Encounter Provider: Angelika Santana, REHANA, APRN  Place of Service: [facility]  Patient Name: Dago Zambrano  :1965        Subjective:     Chief Complaint:  Nonobstructive coronary disease, history of chest discomfort      History of Present Illness:  Dago Zambrano is a 56 y.o. male patient of Dr. Paul.  This patient is new to me and I have reviewed his records.    Patient has a history of hyperlipidemia, borderline diabetes, COVID-19 infection, chest discomfort    Patient was hospitalized 2022 with chest discomfort which had some typical and atypical features.  Stress perfusion study showed moderately severe area of ischemia in the distal inferior wall and subsequent heart cath showed some calcium in the proximal LAD but nonobstructive and some slow flow in the coronary arteries possibly related to endothelial dysfunction.  Risk factor modification was recommended.  Patient was placed on low-dose statin.  He was cleared to proceed with GI eval. He had EGD showing Z-line irregular and this was biopsied.  Colonoscopy showed a polyp which was resected and biopsied as well as some nonbleeding internal hemorrhoids.      Patient presents to office today for follow-up appointment.  Patient reports he is doing okay overall since last visit.  He continues to have chronic fatigue since he had COVID-19 in May.  He feels he has long COVID.  He has not had any chest pain or discomfort since hospital discharge.  Denies shortness of breath, palpitations, racing heartbeat sensation, lower extremity edema, dizziness, syncope, near syncope, falls, or abnormal bleeding or bruising.  He reports he has follow-up appointment with PCP next week.  Blood pressure was on the lower side in the office today but denies any dizziness or lightheadedness symptoms.            Past Medical History:   Diagnosis Date   • COVID-19    • Elevated blood sugar 2018   •  Gout    • Grief reaction    • Hyperlipidemia    • Nocturia 06/06/2018    0-1x/night   • Skin cancer      Past Surgical History:   Procedure Laterality Date   • CARDIAC CATHETERIZATION N/A 9/26/2022    Procedure: Left Heart Cath;  Surgeon: Jaydon Means MD;  Location: [facility] CATH INVASIVE LOCATION;  Service: Cardiology;  Laterality: N/A;   • CARDIAC CATHETERIZATION N/A 9/26/2022    Procedure: Left ventriculography;  Surgeon: Jaydon Means MD;  Location: [facility] CATH INVASIVE LOCATION;  Service: Cardiology;  Laterality: N/A;   • CARDIAC CATHETERIZATION N/A 9/26/2022    Procedure: Coronary angiography;  Surgeon: Jaydon Means MD;  Location: [facility] CATH INVASIVE LOCATION;  Service: Cardiology;  Laterality: N/A;   • COLONOSCOPY N/A 9/27/2022    Procedure: COLONOSCOPY to cecum/ terminal ileum with cold snare polypectomy;  Surgeon: Nathaniel Lomax MD;  Location: [facility] ENDOSCOPY;  Service: Gastroenterology;  Laterality: N/A;  pre- abd pain  post- normal ti, polyp, hemorrhoids    • ENDOSCOPY N/A 9/27/2022    Procedure: ESOPHAGOGASTRODUODENOSCOPY with bx;  Surgeon: Nathaniel Lomax MD;  Location: [facility] ENDOSCOPY;  Service: Gastroenterology;  Laterality: N/A;  pre- abd pain   post- irregular z line      Outpatient Medications Prior to Visit   Medication Sig Dispense Refill   • acetaminophen (TYLENOL) 325 MG tablet Take 2 tablets by mouth Every 6 (Six) Hours As Needed for Mild Pain.     • allopurinol (ZYLOPRIM) 300 MG tablet Take 1 tablet by mouth Daily. 90 tablet 3   • aspirin 81 MG EC tablet Take 81 mg by mouth Daily.     • atorvastatin (LIPITOR) 20 MG tablet Take 1 tablet by mouth Every Night for 180 days. 90 tablet 0   • pantoprazole (PROTONIX) 40 MG EC tablet Take 1 tablet by mouth Every Night for 60 days. 30 tablet 1     No facility-administered medications prior to visit.       Allergies as of 10/05/2022 - Reviewed 09/27/2022   Allergen Reaction Noted   • Declomycin [demeclocycline]  07/26/2016   • Lincocin  "[lincomycin hcl]  07/26/2016   • Pediamycin [erythromycin]  07/26/2016   • Penicillins  07/26/2016   • Sulfa antibiotics  07/26/2016     Social History     Socioeconomic History   • Marital status: Single   Tobacco Use   • Smoking status: Former Smoker     Types: Cigarettes   • Smokeless tobacco: Never Used   Vaping Use   • Vaping Use: Never used   Substance and Sexual Activity   • Alcohol use: Yes     Comment: 2-3 days per weeks (6 packs)   • Drug use: Never   • Sexual activity: Defer     History reviewed. No pertinent family history.      Review of Systems   Constitutional: Positive for malaise/fatigue.   Cardiovascular:        SEE HPI   Respiratory: Negative for shortness of breath.    Hematologic/Lymphatic: Negative for bleeding problem.   Musculoskeletal: Negative for falls.   Gastrointestinal: Negative for melena.   Genitourinary: Negative for hematuria.   Neurological: Negative for dizziness.   Psychiatric/Behavioral: Negative for altered mental status.          Objective:     Vitals:    10/05/22 1026   BP: 100/76   BP Location: Left arm   Patient Position: Sitting   Pulse: 85   Weight: 73.7 kg (162 lb 6.4 oz)   Height: 185.4 cm (73\")     Body mass index is 21.43 kg/m².      PHYSICAL EXAM:  Constitutional:       General: Not in acute distress.     Appearance: Well-developed. Not diaphoretic.   Eyes:      Pupils: Pupils are equal, round, and reactive to light.   HENT:      Head: Normocephalic and atraumatic.   Pulmonary:      Effort: Pulmonary effort is normal. No respiratory distress.      Breath sounds: Normal breath sounds. No wheezing. No rales.   Cardiovascular:      Normal rate. Regular rhythm.      Murmurs: There is no murmur.      No gallop. No click. No rub.      Comments: Right wrist where heart cath was done appears normal without signs/symptoms of infection or complication.  No hematoma or bruising.  Pulses normal distally and proximally.  Edema:     Peripheral edema absent.   Abdominal:      " General: Bowel sounds are normal.      Palpations: Abdomen is soft.   Musculoskeletal: Normal range of motion.         General: No tenderness or deformity. Skin:     General: Skin is warm and dry.      Findings: No erythema.   Neurological:      Mental Status: Alert and oriented to person, place, and time.             ECG 12 Lead    Date/Time: 10/5/2022 11:18 AM  Performed by: Angelika Santana DNP, APRN  Authorized by: Angelika Santana DNP, QUINN   Comparison: compared with previous ECG   Rhythm: sinus rhythm  BPM: 85  Other findings: non-specific ST-T wave changes  Comments: No significant changes from previous EKG              Assessment:       Diagnosis Plan   1. Nonobstructive atherosclerosis of coronary artery     2. History of chest pain           Plan:     1. Nonobstructive coronary artery disease: Recommend aggressive risk factor modification.  Continue statin.  LDL goal less than 70.  2. Questionable endothelial dysfunction: BP too low for medications at this time.  Denies any chest pain or discomfort symptoms since hospital discharge.  Continue to monitor.  3. History of COVID-19 infection: Patient feels he has long COVID.  Following with PCP.  4. History of abdominal pain and weight loss: Following with GI and had EGD and colonoscopy and to follow-up with PCP as well.  On Protonix.  5. Thrombocytopenia: Appears chronic since 5/2022.  Patient denies bleeding or bruising.  Patient to discuss further with PCP.    Patient to schedule III month hospital follow-up appointment with Dr. Paul or follow-up sooner if needed for any new, recurrent, or worsening symptoms or concerns.  Discussed in detail signs/symptoms that warrant sooner call or follow-up to the office or ER visit.           Your medication list          Accurate as of October 5, 2022 11:17 AM. If you have any questions, ask your nurse or doctor.            CONTINUE taking these medications      Instructions Last Dose Given Next Dose Due    acetaminophen 325 MG tablet  Commonly known as: TYLENOL      Take 2 tablets by mouth Every 6 (Six) Hours As Needed for Mild Pain.       allopurinol 300 MG tablet  Commonly known as: ZYLOPRIM      Take 1 tablet by mouth Daily.       aspirin 81 MG EC tablet      Take 81 mg by mouth Daily.       atorvastatin 20 MG tablet  Commonly known as: LIPITOR      Take 1 tablet by mouth Every Night for 180 days.       pantoprazole 40 MG EC tablet  Commonly known as: PROTONIX      Take 1 tablet by mouth Every Night for 60 days.              The above medication changes may not have been made by this provider.  Patient's medication list was updated to reflect medications they are currently taking including medication changes made by other providers.            Thanks,    Angelika Santana, DNP, APRN  10/05/2022         Dictated utilizing Dragon dictation

## 2022-10-06 ENCOUNTER — APPOINTMENT (OUTPATIENT)
Dept: ULTRASOUND IMAGING | Facility: HOSPITAL | Age: 57
End: 2022-10-06

## 2022-10-12 ENCOUNTER — OFFICE VISIT (OUTPATIENT)
Dept: FAMILY MEDICINE CLINIC | Facility: CLINIC | Age: 57
End: 2022-10-12

## 2022-10-12 VITALS
SYSTOLIC BLOOD PRESSURE: 102 MMHG | WEIGHT: 158 LBS | HEIGHT: 73 IN | HEART RATE: 86 BPM | DIASTOLIC BLOOD PRESSURE: 62 MMHG | TEMPERATURE: 98.2 F | BODY MASS INDEX: 20.94 KG/M2 | OXYGEN SATURATION: 98 %

## 2022-10-12 DIAGNOSIS — Z09 HOSPITAL DISCHARGE FOLLOW-UP: Primary | ICD-10-CM

## 2022-10-12 DIAGNOSIS — M1A.0720 IDIOPATHIC CHRONIC GOUT OF LEFT FOOT WITHOUT TOPHUS: ICD-10-CM

## 2022-10-12 DIAGNOSIS — D69.6 THROMBOCYTOPENIA: ICD-10-CM

## 2022-10-12 DIAGNOSIS — R35.1 NOCTURIA: ICD-10-CM

## 2022-10-12 DIAGNOSIS — U09.9 COVID-19 LONG HAULER: ICD-10-CM

## 2022-10-12 DIAGNOSIS — I25.10 NONOBSTRUCTIVE ATHEROSCLEROSIS OF CORONARY ARTERY: ICD-10-CM

## 2022-10-12 DIAGNOSIS — E78.49 OTHER HYPERLIPIDEMIA: ICD-10-CM

## 2022-10-12 PROCEDURE — 99214 OFFICE O/P EST MOD 30 MIN: CPT | Performed by: FAMILY MEDICINE

## 2022-10-12 NOTE — PROGRESS NOTES
"Chief Complaint  Fatigue (Hospital follow up )    Subjective     {Problem List  Visit Diagnosis  Review (Popup)  Encounters  Notes  Medications  Labs  Result Review Imaging  Media :23}     Dago presents to [facility]  For hospital follow up. Pt has not been at work since hospital stay. He was told his symptoms were from long haul covid. No chest pains. Some cardiac plaque noted. Treated medically. He still has profound fatigue.         Objective   Vital Signs:   Vitals:    10/12/22 1455   BP: 102/62   BP Location: Right arm   Patient Position: Sitting   Cuff Size: Adult   Pulse: 86   Temp: 98.2 °F (36.8 °C)   SpO2: 98%   Weight: 71.7 kg (158 lb)   Height: 185.4 cm (72.99\")        BMI is within normal parameters. No other follow-up for BMI required.        Physical Exam  Vitals reviewed.   Constitutional:       General: He is not in acute distress.  Eyes:      General: Lids are normal.      Conjunctiva/sclera: Conjunctivae normal.   Neck:      Vascular: No carotid bruit.      Trachea: No tracheal deviation.   Cardiovascular:      Rate and Rhythm: Normal rate and regular rhythm.      Heart sounds: Normal heart sounds. No murmur heard.  Pulmonary:      Effort: Pulmonary effort is normal.      Breath sounds: Normal breath sounds.   Skin:     General: Skin is warm and dry.   Neurological:      General: No focal deficit present.      Mental Status: He is alert. He is not disoriented.   Psychiatric:         Attention and Perception: Attention normal.         Mood and Affect: Mood normal.         Speech: Speech normal.         Behavior: Behavior normal. Behavior is cooperative.          Result Review :     The following data was reviewed by: Juan Treadwell MD on 10/12/2022:  Tissue Pathology Exam (09/27/2022 12:33)  Comprehensive Metabolic Panel (09/27/2022 04:26)  CBC & Differential (09/27/2022 04:26)  Lipid Panel (09/25/2022 05:15)  Comprehensive Metabolic Panel (09/25/2022 " 05:15)  CBC & Differential (09/25/2022 05:15)      Hospital Course:  Dago Zambrano is a 56 y.o. male presents the hospital with epigastric and chest pain and recent weight loss.  He underwent heart catheterization after an abnormal stress test.  Heart catheterization showed nonobstructive coronary disease with some slow flow in coronary arteries possibly endothelial dysfunction.  Normal LV function.     Patient also reportedly had been losing weight and complaining of some possible epigastric pain.  It was difficult to discern whether his chest pain/epigastric pain was more GI or cardiac related.  Gastroenterology consult team evaluated and completed EGD/colonoscopy.  Please see reports for full details.     Reports from EGD and colonoscopy reviewed.  Patient had internal hemorrhoids and rectal polyp.  Patient understands he will need to call regarding the final pathology from the GI clinic and has been provided the instruction sheet following his endoscopy procedure.  Recommend he avoid NSAIDs with recent epigastric pain.     Regarding CAD and hyperlipidemia.  Plan to continue medical management, I discussed improving diet and exercise.  Statin.  Home aspirin restarted today.  Recommend lifestyle changes at discharge.  Discussed improving heart healthy diet with patient and his sister as he eats a lot of fast food.  I spoke to Munir the nurse practitioner with cardiology today and she is arranging for 1 week follow-up.  At that time if patient has any further chest pain she may consider initiating outpatient beta-blocker or other new cardiac medications but for now she recommended continue home dose aspirin and statin and it is noted that LDL is at goal.     Patient reports recent weight loss.  Etiology is unclear perhaps his appetite has decreased.  I recommend he follow-up closely with his primary care provider and continue to monitor his weight.  If weight loss persists recommend consideration for outpatient  nutritional supplements or further work-up for medical causes on an outpatient basis.  I discussed this plan with the patient who is in agreement.     Recommend improve diet and exercise in the setting of fatty liver food.  Primary care follow-up.     History of COVID-19 could be contributing to recent symptoms.  Continue supportive care and symptom treatment.     Continue PPI for GERD.  Stable     Continue allopurinol for gout.  Stable     Minimal thrombocytopenia is noted.  Previous records reviewed and this is stable since May 2022.  Findings discussed with patient and this can be monitored with primary care provider.  If platelet count worsens further outpatient work-up could be considered.     Patient appears to be ready to discharge and is very eager to discharge home today.  At the time of discharge patient was told to take all medications as prescribed, keep all follow-up appointments, and call their doctor or return to the hospital with any worsening or concerning symptoms.         Assessment and Plan    Diagnoses and all orders for this visit:    1. Hospital discharge follow-up (Primary)  Comments:  Pt will try back to work this saturday. If it doesn't work out, then he will contact our office for work note.     2. Thrombocytopenia (HCC)  Assessment & Plan:  Stop all alcohol. Recheck labs in one month    Orders:  -     CBC & Differential; Future    3. COVID-19 long hauler  -     Comprehensive Metabolic Panel; Future  -     CBC & Differential; Future    4. Other hyperlipidemia  Assessment & Plan:  Lipid abnormalities are improving with treatment.  Pharmacotherapy as ordered.  Lipids will be reassessed in 1 month.    Orders:  -     Comprehensive Metabolic Panel; Future  -     CBC & Differential; Future  -     CK; Future  -     Lipid Panel With / Chol / HDL Ratio; Future    5. Idiopathic chronic gout of left foot without tophus  Assessment & Plan:  The current medical regimen is effective;  continue present  plan and medications.      Orders:  -     Uric Acid; Future    6. Nonobstructive atherosclerosis of coronary artery  Assessment & Plan:  The current medical regimen is effective;  continue present plan and medications.        7. Nocturia  -     PSA DIAGNOSTIC; Future      Follow Up   Return in about 1 month (around 11/12/2022) for recheck of current condition.  Patient was given instructions and counseling regarding his condition or for health maintenance advice. Please see specific information pulled into the AVS if appropriate.

## 2022-10-12 NOTE — ASSESSMENT & PLAN NOTE
Lipid abnormalities are improving with treatment.  Pharmacotherapy as ordered.  Lipids will be reassessed in 1 month.

## 2022-10-26 ENCOUNTER — TELEPHONE (OUTPATIENT)
Dept: FAMILY MEDICINE CLINIC | Facility: CLINIC | Age: 57
End: 2022-10-26

## 2022-10-26 NOTE — TELEPHONE ENCOUNTER
Caller: Dago Zambrano    Relationship to patient: Self    Best call back number:    [phone]         Patient is needing: PATIENT IS CALLING TO CHECK THE STATUS OF HIS [facility] PAPERWORK.  HE STATES HE WOULD LIKE TO  THE PAPERS TODAY SINCE HE HAS A TIMEFRAME TO GET THOSE.    PLEASE CALL PATIENT WHEN PAPERS ARE READY TO .    PLEASE ADVISE.

## 2022-10-31 ENCOUNTER — TELEPHONE (OUTPATIENT)
Dept: FAMILY MEDICINE CLINIC | Facility: CLINIC | Age: 57
End: 2022-10-31

## 2022-10-31 NOTE — TELEPHONE ENCOUNTER
Caller: Dago Zambrano    Relationship to patient: Self    Best call back number: [phone]    Patient is needing: PATIENT IS CALLING TO CHECK THE STATUS OF HIS [facility] PAPERWORK.  HE STATE HE NEEDS HIS THE PAPERWORK TODAY, THAT HE HAS TO TURN IN BY THE END OF THE DAY OR IN THE MORNING.    PLEASE ADVISE STATUS.

## 2022-11-10 ENCOUNTER — OFFICE VISIT (OUTPATIENT)
Dept: FAMILY MEDICINE CLINIC | Facility: CLINIC | Age: 57
End: 2022-11-10

## 2022-11-10 VITALS
OXYGEN SATURATION: 97 % | TEMPERATURE: 98.4 F | DIASTOLIC BLOOD PRESSURE: 60 MMHG | HEIGHT: 73 IN | RESPIRATION RATE: 16 BRPM | SYSTOLIC BLOOD PRESSURE: 102 MMHG | BODY MASS INDEX: 21.6 KG/M2 | WEIGHT: 163 LBS | HEART RATE: 78 BPM

## 2022-11-10 DIAGNOSIS — E78.49 OTHER HYPERLIPIDEMIA: ICD-10-CM

## 2022-11-10 DIAGNOSIS — Z23 NEED FOR INFLUENZA VACCINATION: ICD-10-CM

## 2022-11-10 DIAGNOSIS — Z23 IMMUNIZATION DUE: ICD-10-CM

## 2022-11-10 DIAGNOSIS — U09.9 COVID-19 LONG HAULER: ICD-10-CM

## 2022-11-10 DIAGNOSIS — M1A.0720 IDIOPATHIC CHRONIC GOUT OF LEFT FOOT WITHOUT TOPHUS: ICD-10-CM

## 2022-11-10 DIAGNOSIS — D69.6 THROMBOCYTOPENIA: Primary | ICD-10-CM

## 2022-11-10 PROCEDURE — 99213 OFFICE O/P EST LOW 20 MIN: CPT | Performed by: FAMILY MEDICINE

## 2022-11-10 PROCEDURE — 90686 IIV4 VACC NO PRSV 0.5 ML IM: CPT | Performed by: FAMILY MEDICINE

## 2022-11-10 PROCEDURE — 90472 IMMUNIZATION ADMIN EACH ADD: CPT | Performed by: FAMILY MEDICINE

## 2022-11-10 PROCEDURE — 90471 IMMUNIZATION ADMIN: CPT | Performed by: FAMILY MEDICINE

## 2022-11-10 PROCEDURE — 90750 HZV VACC RECOMBINANT IM: CPT | Performed by: FAMILY MEDICINE

## 2022-11-10 NOTE — PROGRESS NOTES
Immunization  Immunization performed in ,RD by Linda Salcedo MA. Patient tolerated the procedure well without complications.  11/10/22   Linda Salcedo MA

## 2022-11-10 NOTE — PROGRESS NOTES
"Chief Complaint  Follow-up (Complaints of fatigue, night sweats )    Malachi Lzoa presents to [facility]  To follow-up on recent events.  Labs have been reviewed and show normalization of platelets.  His protein levels were low and now they are within normal limits.  Mild elevation of fasting blood sugar to 104.  Lipids are controlled.  He continues to have bouts of fatigue with overexertion.  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  He has not consumed alcoholic beverages since last visit and will continue to do so through the end of this year.  Advised him to not start back until he feels physically back to normal.  He has increased his caloric intake and has gained 5 pounds.        Objective   Vital Signs:   Vitals:    11/10/22 1612   BP: 102/60   Pulse: 78   Resp: 16   Temp: 98.4 °F (36.9 °C)   SpO2: 97%   Weight: 73.9 kg (163 lb)   Height: 185.4 cm (72.99\")        BMI is within normal parameters. No other follow-up for BMI required.        Physical Exam  Constitutional:       General: He is not in acute distress.  Neurological:      Mental Status: He is alert.          Result Review :     The following data was reviewed by: Juna Treadwell MD on 11/10/2022:  PSA DIAGNOSTIC (11/03/2022 08:20)-2.9  Uric Acid (11/03/2022 08:20)-nl  Lipid Panel With / Chol / HDL Ratio (11/03/2022 08:20)  CK (11/03/2022 08:20)  CBC & Differential (11/03/2022 08:20)-platelets normal  Comprehensive Metabolic Panel (11/03/2022 08:20)-glucose 104           Assessment and Plan    Diagnoses and all orders for this visit:    1. Thrombocytopenia (HCC) (Primary)  Assessment & Plan:  Resolved. Continue abstinence of etoh until January. Recheck labs next regular visit.     Orders:  -     CBC & Differential; Future    2. COVID-19 long hauler  Assessment & Plan:  Slowly improving. Pt back at work. Resume exercise as tolerated.       3. Need for influenza vaccination  -     " FluLaval/Fluarix/Fluzone >6 Months    4. Other hyperlipidemia  -     Comprehensive Metabolic Panel; Future  -     CBC & Differential; Future  -     Lipid Panel; Future  -     CK; Future    5. Idiopathic chronic gout of left foot without tophus  -     Uric Acid; Future      Follow Up   Return in about 29 weeks (around 6/1/2023) for recheck/refill medication.  Patient was given instructions and counseling regarding his condition or for health maintenance advice. Please see specific information pulled into the AVS if appropriate.

## 2022-11-11 ENCOUNTER — TELEPHONE (OUTPATIENT)
Dept: FAMILY MEDICINE CLINIC | Facility: CLINIC | Age: 57
End: 2022-11-11

## 2022-11-11 NOTE — TELEPHONE ENCOUNTER
Pt wants to know if he should continue taking the Pantoprazole?  If so he will need a refill please.

## 2022-12-20 DIAGNOSIS — E78.49 OTHER HYPERLIPIDEMIA: ICD-10-CM

## 2022-12-21 RX ORDER — ATORVASTATIN CALCIUM 20 MG/1
20 TABLET, FILM COATED ORAL NIGHTLY
Qty: 90 TABLET | Refills: 1 | Status: SHIPPED | OUTPATIENT
Start: 2022-12-21 | End: 2023-06-19

## 2023-01-27 ENCOUNTER — OFFICE VISIT (OUTPATIENT)
Dept: CARDIOLOGY | Facility: CLINIC | Age: 58
End: 2023-01-27
Payer: COMMERCIAL

## 2023-01-27 VITALS
HEIGHT: 73 IN | WEIGHT: 168 LBS | BODY MASS INDEX: 22.26 KG/M2 | SYSTOLIC BLOOD PRESSURE: 110 MMHG | DIASTOLIC BLOOD PRESSURE: 90 MMHG | HEART RATE: 66 BPM

## 2023-01-27 DIAGNOSIS — R94.39 ABNORMAL STRESS TEST: ICD-10-CM

## 2023-01-27 DIAGNOSIS — E78.49 OTHER HYPERLIPIDEMIA: Primary | ICD-10-CM

## 2023-01-27 DIAGNOSIS — I25.10 NONOBSTRUCTIVE ATHEROSCLEROSIS OF CORONARY ARTERY: ICD-10-CM

## 2023-01-27 DIAGNOSIS — Z87.898 HISTORY OF CHEST PAIN: ICD-10-CM

## 2023-01-27 PROCEDURE — 99214 OFFICE O/P EST MOD 30 MIN: CPT | Performed by: NURSE PRACTITIONER

## 2023-01-27 PROCEDURE — 93000 ELECTROCARDIOGRAM COMPLETE: CPT | Performed by: NURSE PRACTITIONER

## 2023-01-27 NOTE — PROGRESS NOTES
Subjective:     Encounter Date:01/27/2023      Patient ID: Dago Zambrano is a 57 y.o. male.    Chief Complaint:follow up nonobstructive CAD  History of Present Illness  This is a 56 y/o man who follows with Dr. Paul and is new to me today. He has a pmhx of hyperlipidemia, diabetes, COVID-19 and chest discomfort. He was hospitalized in September 2022 with complaints of chest discomfort. He underwent a stress perfusion study that showed moderately severe area of ischemia in the distal inferior wall and subsequent heart cath showed calcium in the proximal LAD that was nonobstructive along with some slow flow int he coronary arteries related to endothelial dysfunction. He was started on low-dose statin. He underwent a GI evaluation that showed Z-line irregular and this was biopsied. He also had a colonoscopy which showed a polyp that was resected and biopsied. Both biopsies were benign.     He followed up with QUINN Mcdonough in October and was doing ok overall. He was still struggling with chronic fatigue from having COVID. He was instructed to follow up in 3 months.    He is here today for his follow up visit. He is still struggling with the long haul COVID. He is sleeping about 10 lbs. He is working out 6 days a week for 1 hour and 15 minutes. 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 He denies any chest pain, shortness of breath or palpitations. He denies any dizziness or syncope. He denies any swelling in his lower extremities, orthopnea or PND. He has been trying to eat healthy, eliminating Big Reds, alcohol and fatty foods from his diet. He is just very frustrated with the long haul symptoms.    I have reviewed and updated as appropriate allergies, current medications, past family history, past medical history, past surgical history and problem list.    Review of Systems   Constitutional: Positive for malaise/fatigue. Negative for fever, weight gain and weight loss.   HENT: Negative for congestion, hoarse voice and sore  throat.    Eyes: Negative for blurred vision and double vision.   Cardiovascular: Negative for chest pain, dyspnea on exertion, leg swelling, orthopnea, palpitations and syncope.   Respiratory: Negative for cough, shortness of breath and wheezing.    Gastrointestinal: Negative for abdominal pain, hematemesis, hematochezia and melena.   Genitourinary: Negative for dysuria and hematuria.   Neurological: Negative for dizziness, headaches, light-headedness and numbness.   Psychiatric/Behavioral: Negative for depression. The patient is not nervous/anxious.          Current Outpatient Medications:   •  allopurinol (ZYLOPRIM) 300 MG tablet, Take 1 tablet by mouth Daily., Disp: 90 tablet, Rfl: 3  •  aspirin 81 MG EC tablet, Take 81 mg by mouth Daily., Disp: , Rfl:   •  atorvastatin (LIPITOR) 20 MG tablet, Take 1 tablet by mouth Every Night for 180 days., Disp: 90 tablet, Rfl: 1  •  acetaminophen (TYLENOL) 325 MG tablet, Take 2 tablets by mouth Every 6 (Six) Hours As Needed for Mild Pain., Disp: , Rfl:     Past Medical History:   Diagnosis Date   • COVID-19    • Elevated blood sugar 06/06/2018   • Gout    • Grief reaction    • Hyperlipidemia    • Nocturia 06/06/2018    0-1x/night   • Skin cancer        Past Surgical History:   Procedure Laterality Date   • CARDIAC CATHETERIZATION N/A 9/26/2022    Procedure: Left Heart Cath;  Surgeon: Jaydon Means MD;  Location: [facility] INVASIVE LOCATION;  Service: Cardiology;  Laterality: N/A;   • CARDIAC CATHETERIZATION N/A 9/26/2022    Procedure: Left ventriculography;  Surgeon: Jaydon Means MD;  Location: [facility] INVASIVE LOCATION;  Service: Cardiology;  Laterality: N/A;   • CARDIAC CATHETERIZATION N/A 9/26/2022    Procedure: Coronary angiography;  Surgeon: Jaydon Means MD;  Location: [facility] CATH INVASIVE LOCATION;  Service: Cardiology;  Laterality: N/A;   • COLONOSCOPY N/A 9/27/2022    Procedure: COLONOSCOPY to cecum/ terminal ileum with cold snare polypectomy;   "Surgeon: Nathaniel Lomax MD;  Location: [facility] ENDOSCOPY;  Service: Gastroenterology;  Laterality: N/A;  pre- abd pain  post- normal ti, polyp, hemorrhoids    • ENDOSCOPY N/A 9/27/2022    Procedure: ESOPHAGOGASTRODUODENOSCOPY with bx;  Surgeon: Nathaniel Lomax MD;  Location: [facility] ENDOSCOPY;  Service: Gastroenterology;  Laterality: N/A;  pre- abd pain   post- irregular z line        No family history on file.    Social History     Tobacco Use   • Smoking status: Former     Types: Cigarettes   • Smokeless tobacco: Never   Vaping Use   • Vaping Use: Never used   Substance Use Topics   • Alcohol use: Yes     Comment: 2-3 days per weeks (6 packs)   • Drug use: Never         ECG 12 Lead    Date/Time: 1/27/2023 1:32 PM  Performed by: Magalys Urena APRN  Authorized by: Magalys Urena APRN   Comparison: compared with previous ECG from 10/5/2022  Similar to previous ECG  Rhythm: sinus rhythm  Comments: No significant change from previous EKG               Objective:     Visit Vitals  /90 (BP Location: Left arm, Patient Position: Sitting, Cuff Size: Adult)   Pulse 66   Ht 185.4 cm (72.99\")   Wt 76.2 kg (168 lb)   BMI 22.17 kg/m²             Physical Exam  Constitutional:       Appearance: Normal appearance. He is normal weight.   HENT:      Head: Normocephalic.   Neck:      Vascular: No carotid bruit.   Cardiovascular:      Rate and Rhythm: Normal rate and regular rhythm.      Chest Wall: PMI is not displaced.      Pulses: Normal pulses.           Radial pulses are 2+ on the right side and 2+ on the left side.        Posterior tibial pulses are 2+ on the right side and 2+ on the left side.      Heart sounds: Normal heart sounds. No murmur heard.    No friction rub. No gallop.   Pulmonary:      Effort: Pulmonary effort is normal.      Breath sounds: Normal breath sounds.   Abdominal:      General: Bowel sounds are normal. There is no distension.      Palpations: Abdomen is soft.   Musculoskeletal:      Right lower " leg: No edema.      Left lower leg: No edema.   Skin:     General: Skin is warm and dry.      Capillary Refill: Capillary refill takes less than 2 seconds.   Neurological:      Mental Status: He is alert and oriented to person, place, and time.   Psychiatric:         Mood and Affect: Mood normal.         Behavior: Behavior normal.         Thought Content: Thought content normal.          Lab Review:   Lipid Panel    Lipid Panel 9/25/22 11/3/22   Total Cholesterol 138    Total Cholesterol  159   Triglycerides 106 87   HDL Cholesterol 65 (A) 57   VLDL Cholesterol 19 16   LDL Cholesterol  54 86   LDL/HDL Ratio 0.80    (A) Abnormal value       Comments are available for some flowsheets but are not being displayed.               Cardiac Procedures:   1. Cardiac catheterization 9/26/22:  FINDINGS:     LEFT VENTRICULOGRAPHY: The LV pressure was 82/2 .  There was normal LV systolic function without segmental wall motion abnormality EF is 50% there is calcium in the LAD and RCA.  There was no mitral insufficiency or gradient across the aortic valve on pullback.     CORONARY ANGIOGRAPHY:  Left main: Normal  Left anterior descending: Angiographically normal throughout although there is BETTY II flow throughout the vessel and may indicate a little early endothelial dysfunction.  There is calcium in the proximal LAD nonobstructive.  The diagonals and septal perforators are free of disease  Ramus intermedius:Not present  Circumflex: Normal throughout  RCA: Is a dominant vessel.  Angiographically normal throughout but BETTY II flow in the vessel.     SUMMARY: Normal LV systolic function without segmental wall motion normality.  Normal LVEDP.  Angiographically normal blood vessels but some slow flow in the coronaries possibly consistent with endothelial dysfunction.  There is calcium in the coronaries nonobstructive    2. Nuclear stress test 9/24/22:  • Calculated left ventricular EF = 59% Estimated left ventricular EF = 59%  Estimated left ventricular EF was in agreement with the calculated left ventricular EF. Left ventricular systolic function is normal. Normal left ventricular cavity size noted. Left ventricular wall thickness is consistent with mild concentric hypertrophy. All left ventricular wall segments contract normally. Left ventricular diastolic function was indeterminate.  • Trace tricuspid valve regurgitation is present. Estimated right ventricular systolic pressure from tricuspid regurgitation is normal (<35 mmHg). Calculated right ventricular systolic pressure from tricuspid regurgitation is 23.1 mmHg.     3. Echocardiogram 9/24/22:  • Myocardial perfusion imaging indicates a moderately severe area of ischemia located in the distal inferior wall.  • There is a small inferior defect that is more pronounced with stress. There is normal wall motion. There is significant subdiaphragmatic tracer uptake which limits the specificity of these findings..  • Left ventricular ejection fraction is hyperdynamic (Calculated EF > 70%). .  • Impressions are consistent with a low risk study.         Assessment:         Diagnoses and all orders for this visit:    1. Other hyperlipidemia (Primary)    2. Nonobstructive atherosclerosis of coronary artery    3. Abnormal stress test    4. History of chest pain    Other orders  -     ECG 12 Lead            Plan:       1. CAD: nonobstructive disease noted on cardiac catheterization in September 2022. On aspirin and statin therapy. EKG is stable with no ischemic changes. Recommend continuing risk factor modifications.   2. Hyperlipidemia: on statin therapy. Goal LDL < 70.   3. COVID-19: sounds like he has long haul symptoms. Recommend following up with PCP regarding this. I did explain that these symptoms can linger for several months and reassured him it is not related to his heart.    Thank you for allowing me to participate in this patient's care. Please call with any questions or concerns.   [place] will follow up with Dr. Paul in 1 year.          Your medication list          Accurate as of January 27, 2023  1:33 PM. If you have any questions, ask your nurse or doctor.            CONTINUE taking these medications      Instructions Last Dose Given Next Dose Due   acetaminophen 325 MG tablet  Commonly known as: TYLENOL      Take 2 tablets by mouth Every 6 (Six) Hours As Needed for Mild Pain.       allopurinol 300 MG tablet  Commonly known as: ZYLOPRIM      Take 1 tablet by mouth Daily.       aspirin 81 MG EC tablet      Take 81 mg by mouth Daily.       atorvastatin 20 MG tablet  Commonly known as: LIPITOR      Take 1 tablet by mouth Every Night for 180 days.                Magalys Urena, APRN  01/27/23  11:32 AM EST

## 2023-06-08 ENCOUNTER — OFFICE VISIT (OUTPATIENT)
Dept: FAMILY MEDICINE CLINIC | Facility: CLINIC | Age: 58
End: 2023-06-08
Payer: COMMERCIAL

## 2023-06-08 VITALS
OXYGEN SATURATION: 98 % | SYSTOLIC BLOOD PRESSURE: 177 MMHG | TEMPERATURE: 97.6 F | HEIGHT: 73 IN | WEIGHT: 165 LBS | BODY MASS INDEX: 21.87 KG/M2 | DIASTOLIC BLOOD PRESSURE: 76 MMHG | RESPIRATION RATE: 18 BRPM | HEART RATE: 77 BPM

## 2023-06-08 DIAGNOSIS — E78.49 OTHER HYPERLIPIDEMIA: ICD-10-CM

## 2023-06-08 DIAGNOSIS — U09.9 COVID-19 LONG HAULER: ICD-10-CM

## 2023-06-08 DIAGNOSIS — M1A.0720 IDIOPATHIC CHRONIC GOUT OF LEFT FOOT WITHOUT TOPHUS: ICD-10-CM

## 2023-06-08 DIAGNOSIS — Z23 IMMUNIZATION DUE: ICD-10-CM

## 2023-06-08 DIAGNOSIS — R05.9 COUGH, UNSPECIFIED TYPE: Primary | ICD-10-CM

## 2023-06-08 DIAGNOSIS — Z86.16 HISTORY OF COVID-19: ICD-10-CM

## 2023-06-08 DIAGNOSIS — R73.09 ELEVATED GLUCOSE: ICD-10-CM

## 2023-06-08 DIAGNOSIS — D69.6 THROMBOCYTOPENIA: ICD-10-CM

## 2023-06-08 PROBLEM — K76.0 FATTY LIVER: Status: RESOLVED | Noted: 2022-06-08 | Resolved: 2023-06-08

## 2023-06-08 LAB
EXPIRATION DATE: NORMAL
INTERNAL CONTROL: NORMAL
Lab: NORMAL
SARS-COV-2 AG UPPER RESP QL IA.RAPID: NOT DETECTED

## 2023-06-08 PROCEDURE — 87426 SARSCOV CORONAVIRUS AG IA: CPT | Performed by: FAMILY MEDICINE

## 2023-06-08 RX ORDER — ALLOPURINOL 100 MG/1
100 TABLET ORAL DAILY
Qty: 90 TABLET | Refills: 3 | Status: SHIPPED | OUTPATIENT
Start: 2023-06-08 | End: 2024-06-07

## 2023-06-08 RX ORDER — ATORVASTATIN CALCIUM 20 MG/1
20 TABLET, FILM COATED ORAL NIGHTLY
Qty: 90 TABLET | Refills: 3 | Status: SHIPPED | OUTPATIENT
Start: 2023-06-08 | End: 2024-06-07

## 2023-06-08 NOTE — PROGRESS NOTES
"Chief Complaint  Hyperlipidemia (6 month f/u) and Sore Throat (Cough, sinus congestion )    Subjective        HPI   Dago presents to [facility] for  lab review and to refill current medications. Overall he feels well. No medication side effects are reported. Mild elevation of glucose. Lipids unchanged. Uric acid is supratherapeutic.          Objective   Vital Signs:   Vitals:    06/08/23 1616   BP: 177/76   Pulse: 77   Resp: 18   Temp: 97.6 °F (36.4 °C)   SpO2: 98%   Weight: 74.8 kg (165 lb)   Height: 185.4 cm (72.99\")        BMI is within normal parameters. No other follow-up for BMI required.        Physical Exam  Vitals reviewed.   Constitutional:       General: He is not in acute distress.  Eyes:      General: Lids are normal.      Conjunctiva/sclera: Conjunctivae normal.   Neck:      Vascular: No carotid bruit.      Trachea: No tracheal deviation.   Cardiovascular:      Rate and Rhythm: Normal rate and regular rhythm.      Heart sounds: Normal heart sounds. No murmur heard.  Pulmonary:      Effort: Pulmonary effort is normal.      Breath sounds: Normal breath sounds.   Skin:     General: Skin is warm and dry.   Neurological:      Mental Status: He is alert. He is not disoriented.   Psychiatric:         Speech: Speech normal.         Behavior: Behavior normal. Behavior is cooperative.        Result Review :     The following data was reviewed by: Juan Treadwell MD on 06/08/2023:  POCT SARS-CoV-2 Antigen FAINA (06/08/2023 16:30)     Uric Acid (06/01/2023 08:15)  CK (06/01/2023 08:15)  Lipid Panel (06/01/2023 08:15)  CBC & Differential (06/01/2023 08:15)  Comprehensive Metabolic Panel (06/01/2023 08:15)     Assessment and Plan    Diagnoses and all orders for this visit:    1. Cough, unspecified type (Primary)  Comments:  sx care. no fever. followup prn  Orders:  -     POCT SARS-CoV-2 Antigen FAINA    2. Idiopathic chronic gout of left foot without tophus  Assessment & " Plan:  Supratherapeutic, will decrease allopurinol to 100 mg daily    Orders:  -     allopurinol (ZYLOPRIM) 100 MG tablet; Take 1 tablet by mouth Daily.  Dispense: 90 tablet; Refill: 3  -     Uric Acid; Future    3. Other hyperlipidemia  Assessment & Plan:  Lipid abnormalities are unchanged.  Pharmacotherapy as ordered.  Lipids will be reassessed in 1 year.    Orders:  -     atorvastatin (LIPITOR) 20 MG tablet; Take 1 tablet by mouth Every Night.  Dispense: 90 tablet; Refill: 3  -     Comprehensive Metabolic Panel; Future  -     CBC & Differential; Future  -     Lipid Panel; Future  -     CK; Future    4. History of COVID-19  Assessment & Plan:  Still fatigued. Sleeps 10 hours a day.       5. COVID-19 long hauler  Assessment & Plan:  Slow improvement      6. Thrombocytopenia  Assessment & Plan:  resolved      7. Elevated glucose  -     Comprehensive Metabolic Panel; Future  -     Hemoglobin A1c; Future  -     MicroAlbumin, Urine, Random - Urine, Clean Catch; Future    8. Immunization due  -     Shingrix Vaccine        Follow Up   Return in about 7 months (around 1/10/2024) for recheck of current condition.  Patient was given instructions and counseling regarding his condition or for health maintenance advice. Please see specific information pulled into the AVS if appropriate.

## 2024-01-02 ENCOUNTER — TELEPHONE (OUTPATIENT)
Dept: FAMILY MEDICINE CLINIC | Facility: CLINIC | Age: 59
End: 2024-01-02

## 2024-01-02 NOTE — TELEPHONE ENCOUNTER
Caller: Dago Zambrano    Relationship: Self    Best call back number: [phone]    What form or medical record are you requesting: TEST RESULTS FROM 6/1    Who is requesting this form or medical record from you: SELF    How would you like to receive the form or medical records (pick-up, mail, fax):     Timeframe paperwork needed:AS SOON AS POSSIBLE

## 2024-01-07 NOTE — PROGRESS NOTES
Date of Office Visit: 2024  Encounter Provider: Batsheva Paul MD  Place of Service: [facility]  Patient Name: Dago Zambrano  :1965    Chief complaint  CAD    History of Present Illness  The patient is a 59 yo female with prediabetes and hyperlipidemia who was admitted with chest pain, abdominal pain weight loss, abnormal EKG with anterolateral ST T wave changes and COVID pneumonia in 2022. ECHO with LVEF 59%, indeterminate diastolic function and left ventricular hypertrophy with trivial valvular regurgitation and normal RVSP. Stress perfusion was abnormal with inferior wall ischemia. Cardiac cath showed normal LV systolic function with calcified nonobstructive coronary disease.     Since last visit patient has been exercising for an hour 6 days a week.  He has had no chest pain shortness of breath palpitations syncope near syncope.  Patient believes states he had blood work earlier today.  Results are pending.    Past Medical History:   Diagnosis Date    COVID-19     Elevated blood sugar 2018    Fatty liver 2022    Gout     Grief reaction     Hyperlipidemia     Nocturia 2018    0-1x/night    Skin cancer     Thrombocytopenia 2022     Past Surgical History:   Procedure Laterality Date    CARDIAC CATHETERIZATION N/A 2022    Procedure: Left Heart Cath;  Surgeon: Jaydon Means MD;  Location: [facility] CATH INVASIVE LOCATION;  Service: Cardiology;  Laterality: N/A;    CARDIAC CATHETERIZATION N/A 2022    Procedure: Left ventriculography;  Surgeon: Jaydon Means MD;  Location: [facility] CATH INVASIVE LOCATION;  Service: Cardiology;  Laterality: N/A;    CARDIAC CATHETERIZATION N/A 2022    Procedure: Coronary angiography;  Surgeon: Jaydon Means MD;  Location: [facility] CATH [facility]AS[facility] LOC[facility];  Service: Cardiology;  Laterality: N/A;    COLONOSCOPY N/A 2022    Procedure: COLONOSCOPY to cecum/ terminal ileum with cold snare  polypectomy;  Surgeon: Nathaniel Lomax MD;  Location: [facility] ENDOSCOPY;  Service: Gastroenterology;  Laterality: N/A;  pre- abd pain  post- normal ti, polyp, hemorrhoids     ENDOSCOPY N/A 9/27/2022    Procedure: ESOPHAGOGASTRODUODENOSCOPY with bx;  Surgeon: Nathaniel Lomax MD;  Location: [facility] ENDOSCOPY;  Service: Gastroenterology;  Laterality: N/A;  pre- abd pain   post- irregular z line      Outpatient Medications Prior to Visit   Medication Sig Dispense Refill    acetaminophen (TYLENOL) 325 MG tablet Take 2 tablets by mouth Every 6 (Six) Hours As Needed for Mild Pain.      allopurinol (ZYLOPRIM) 100 MG tablet Take 1 tablet by mouth Daily. 90 tablet 3    aspirin 81 MG EC tablet Take 1 tablet by mouth Daily.      atorvastatin (LIPITOR) 20 MG tablet Take 1 tablet by mouth Every Night. 90 tablet 3     No facility-administered medications prior to visit.       Allergies as of 01/09/2024 - Reviewed 01/09/2024   Allergen Reaction Noted    Declomycin [demeclocycline]  07/26/2016    Lincocin [lincomycin hcl]  07/26/2016    Pediamycin [erythromycin]  07/26/2016    Penicillins  07/26/2016    Sulfa antibiotics  07/26/2016     Social History     Socioeconomic History    Marital status: Single   Tobacco Use    Smoking status: Former     Types: Cigarettes    Smokeless tobacco: Never   Vaping Use    Vaping Use: Never used   Substance and Sexual Activity    Alcohol use: Yes     Comment: 2-3 days per weeks (6 packs)    Drug use: Never    Sexual activity: Defer     History reviewed. No pertinent family history.  Review of Systems   Constitutional: Negative for chills, fever, weight gain and weight loss.   Cardiovascular:  Negative for leg swelling.   Respiratory:  Negative for cough, snoring and wheezing.    Hematologic/Lymphatic: Negative for bleeding problem. Does not bruise/bleed easily.   Skin:  Negative for color change.   Musculoskeletal:  Negative for falls, joint pain and myalgias.   Gastrointestinal:  Negative for  "melena.   Genitourinary:  Negative for hematuria.   Neurological:  Negative for excessive daytime sleepiness.   Psychiatric/Behavioral:  Negative for depression. The patient is not nervous/anxious.         Objective:     Vitals:    01/09/24 0906   BP: 145/84   BP Location: Right arm   Patient Position: Sitting   Cuff Size: Large Adult   Pulse: 97   Weight: 77.5 kg (170 lb 12.8 oz)   Height: 185.4 cm (72.99\")     Body mass index is 22.54 kg/m².    Vitals reviewed.   Constitutional:       Appearance: Well-developed.   Eyes:      General: No scleral icterus.        Right eye: No discharge.      Conjunctiva/sclera: Conjunctivae normal.      Pupils: Pupils are equal, round, and reactive to light.   HENT:      Head: Normocephalic.      Nose: Nose normal.   Neck:      Thyroid: No thyromegaly.      Vascular: No JVD.   Pulmonary:      Effort: Pulmonary effort is normal. No respiratory distress.      Breath sounds: Normal breath sounds. No wheezing. No rales.   Cardiovascular:      Normal rate. Regular rhythm. Normal S1. Normal S2.       Murmurs: There is no murmur.      No gallop.    Pulses:     Intact distal pulses.      Carotid: 2+ bilaterally.     Radial: 2+ bilaterally.     Femoral: 2+ bilaterally.     Popliteal: 2+ bilaterally.     Dorsalis pedis: 2+ bilaterally.     Posterior tibial: 2+ bilaterally.  Edema:     Peripheral edema absent.   Abdominal:      General: Bowel sounds are normal. There is no distension.      Palpations: Abdomen is soft.      Tenderness: There is no abdominal tenderness. There is no rebound.   Musculoskeletal: Normal range of motion.         General: No tenderness.      Cervical back: Normal range of motion and neck supple. Skin:     General: Skin is warm and dry.      Findings: No erythema or rash.   Neurological:      Mental Status: Alert and oriented to person, place, and time.   Psychiatric:         Behavior: Behavior normal.         Thought Content: Thought content normal.         Judgment: " Judgment normal.       Lab Review:   Lab Results - Last 18 Months   Lab Units 06/01/23  0815 11/03/22 0820 09/27/22 0426 09/25/22  0515   WBC 10*3/mm3 4.07 4.26 5.16 5.00   RBC 10*6/mm3 5.47 5.08 4.81 4.87   HEMOGLOBIN g/dL 16.0 15.1 14.1 14.7   HEMATOCRIT % 48.0 46.8 44.5 43.1   MCV fL 87.8 92.1 92.5 88.5   MCH pg 29.3 29.7 29.3 30.2   MCHC g/dL 33.3 32.3 31.7 34.1   RDW % 13.1 12.4 12.7 12.6   PLATELETS 10*3/mm3 166 178 120* 132*   NEUTROPHIL % % 49.2 58.5 61.0 56.6   LYMPHOCYTE % % 38.3 30.0 28.9 33.0   MONOCYTES % % 8.1 7.5 7.9 8.6   EOSINOPHIL % % 3.7 3.3 1.2 1.2   BASOPHIL % % 0.5 0.5 0.6 0.4   NEUTROS ABS 10*3/mm3 2.00 2.49 3.15 2.83   LYMPHS ABS 10*3/mm3 1.56 1.28 1.49 1.65   MONOS ABS 10*3/mm3 0.33 0.32 0.41 0.43   EOS ABS 10*3/mm3 0.15 0.14 0.06 0.06   BASOS ABS 10*3/mm3 0.02 0.02 0.03 0.02   IMM GRAN % % 0.2 0.2  --   --    IMMATURE GRANS (ABS) 10*3/mm3 0.01 0.01  --   --    RDW-SD fl  --   --  43.3 41.5   MPV fL  --   --  10.2 10.4       Lab Results - Last 18 Months   Lab Units 06/01/23 0815 11/03/22 0820 09/27/22 0426 09/25/22  0515   GLUCOSE mg/dL 109* 104* 98 92   BUN mg/dL 18 14 11 14   CREATININE mg/dL 1.13 1.23 0.89 0.91   SODIUM mmol/L 145 142 140 136   POTASSIUM mmol/L 4.5 4.4 3.7 4.1   CHLORIDE mmol/L 106 103 105 102   CO2 mmol/L 28.5 31.0* 23.0 24.1   CALCIUM mg/dL 9.4 9.5 8.9 9.1   TOTAL PROTEIN g/dL  --   --  5.6* 6.0   ALBUMIN g/dL 4.6 4.60 3.80 4.10   ALT (SGPT) U/L 24 20 21 13   AST (SGOT) U/L 20 17 28 18   ALK PHOS U/L 70 65 42 48   BILIRUBIN mg/dL 0.4 0.6 0.6 0.9   GLOBULIN gm/dL  --   --  1.8 1.9   A/G RATIO g/dL  --   --  2.1 2.2   BUN / CREAT RATIO  15.9 11.4 12.4 15.4   ANION GAP mmol/L  --   --  12.0 9.9   EGFR mL/min/1.73  --   --  100.0 98.3     Lab Results - Last 18 Months   Lab Units 06/01/23  0815 11/03/22  0820 09/25/22  0515   CHOLESTEROL mg/dL  --   --  138   TRIGLYCERIDES mg/dL 90 87 106   HDL CHOL mg/dL 55 57 65*   LDL CHOL mg/dL 88 86 54   VLDL CHOL mg/dL  --   --   "19   VLDL CHOLESTEROL CATALINO mg/dL 17 16  --    LDL/HDL RATIO   --   --  0.80     No results for input(s): \"PROBNP\" in the last 57597 hours.  Lab Results - Last 18 Months   Lab Units 09/24/22 0428 09/24/22  0034   TROPONIN T ng/mL <0.010 <0.010     No results for input(s): \"TSH\" in the last 61984 hours.  Lab Results - Last 18 Months   Lab Units 09/27/22  0426   PROTIME Seconds 13.7           ECG 12 Lead    Date/Time: 1/9/2024 9:26 AM  Performed by: Batsheva Paul MD    Authorized by: Batsheva Paul MD  Comparison: compared with previous ECG   Comparison to previous ECG: Heart rate has increased..  Continue statin modification.  Rhythm: sinus rhythm    Clinical impression: normal ECG            Diagnosis Plan   1. Nonobstructive atherosclerosis of coronary artery  ECG 12 Lead      2. Other hyperlipidemia          Plan:       Mild nonobstructive coronary disease he needs better glucose cholesterol and blood pressure control.   Hyperlipidemia.  She will address, done today with Dr. Wilson is a 69, age greater than 40, 50.  Possible prediabetes.  As above.  He will make additional changes on low carbohydrate diet.  He will discuss this further with Dr. Treadwell  Elevated blood pressure.  Suspect Dr. Schrader with salt likely contributing.  Will make additional changes and start checking this more consistently with his sister's assistance at home.  She is a nurse.      Time Spent: I spent 25 minutes caring for Dago on this date of service. This time includes time spent by me in the following activities: preparing for the visit, reviewing tests, obtaining and/or reviewing a separately obtained history, performing a medically appropriate examination and/or evaluation, counseling and educating the patient/family/caregiver, documenting information in the medical record, and independently interpreting results and communicating that information with the patient/family/caregiver.   I spent 1 minutes on the separately reported " service of ECG. This time is not included in the time used to support the E/M service also reported today.        Your medication list            Accurate as of January 9, 2024 11:59 PM. If you have any questions, ask your nurse or doctor.                CONTINUE taking these medications        Instructions Last Dose Given Next Dose Due   acetaminophen 325 MG tablet  Commonly known as: TYLENOL      Take 2 tablets by mouth Every 6 (Six) Hours As Needed for Mild Pain.       allopurinol 100 MG tablet  Commonly known as: ZYLOPRIM      Take 1 tablet by mouth Daily.       aspirin 81 MG EC tablet      Take 1 tablet by mouth Daily.       atorvastatin 20 MG tablet  Commonly known as: LIPITOR      Take 1 tablet by mouth Every Night.                Patient is no longer taking -.  I corrected the med list to reflect this.  I did not stop these medications.      Dictated utilizing Dragon dictation

## 2024-01-09 ENCOUNTER — OFFICE VISIT (OUTPATIENT)
Dept: CARDIOLOGY | Facility: CLINIC | Age: 59
End: 2024-01-09
Payer: COMMERCIAL

## 2024-01-09 VITALS
DIASTOLIC BLOOD PRESSURE: 84 MMHG | WEIGHT: 170.8 LBS | HEART RATE: 97 BPM | BODY MASS INDEX: 22.64 KG/M2 | HEIGHT: 73 IN | SYSTOLIC BLOOD PRESSURE: 145 MMHG

## 2024-01-09 DIAGNOSIS — I25.10 NONOBSTRUCTIVE ATHEROSCLEROSIS OF CORONARY ARTERY: Primary | ICD-10-CM

## 2024-01-09 DIAGNOSIS — E78.49 OTHER HYPERLIPIDEMIA: ICD-10-CM

## 2024-01-09 PROCEDURE — 99213 OFFICE O/P EST LOW 20 MIN: CPT | Performed by: INTERNAL MEDICINE

## 2024-01-09 PROCEDURE — 93000 ELECTROCARDIOGRAM COMPLETE: CPT | Performed by: INTERNAL MEDICINE

## 2024-01-15 ENCOUNTER — OFFICE VISIT (OUTPATIENT)
Dept: FAMILY MEDICINE CLINIC | Facility: CLINIC | Age: 59
End: 2024-01-15
Payer: COMMERCIAL

## 2024-01-15 VITALS
DIASTOLIC BLOOD PRESSURE: 65 MMHG | HEIGHT: 73 IN | OXYGEN SATURATION: 96 % | SYSTOLIC BLOOD PRESSURE: 106 MMHG | RESPIRATION RATE: 16 BRPM | TEMPERATURE: 97.6 F | BODY MASS INDEX: 23.22 KG/M2 | WEIGHT: 175.2 LBS | HEART RATE: 72 BPM

## 2024-01-15 DIAGNOSIS — Z11.59 NEED FOR HEPATITIS C SCREENING TEST: ICD-10-CM

## 2024-01-15 DIAGNOSIS — M1A.0720 IDIOPATHIC CHRONIC GOUT OF LEFT FOOT WITHOUT TOPHUS: ICD-10-CM

## 2024-01-15 DIAGNOSIS — E78.49 OTHER HYPERLIPIDEMIA: Primary | ICD-10-CM

## 2024-01-15 DIAGNOSIS — M1A.00X0 IDIOPATHIC CHRONIC GOUT WITHOUT TOPHUS, UNSPECIFIED SITE: ICD-10-CM

## 2024-01-15 PROBLEM — Z87.898 HISTORY OF CHEST PAIN: Status: RESOLVED | Noted: 2022-10-05 | Resolved: 2024-01-15

## 2024-01-15 PROBLEM — R73.09 ELEVATED GLUCOSE: Status: RESOLVED | Noted: 2023-06-08 | Resolved: 2024-01-15

## 2024-01-15 PROBLEM — R63.0 LACK OF APPETITE: Status: RESOLVED | Noted: 2022-09-07 | Resolved: 2024-01-15

## 2024-01-15 PROBLEM — R94.39 ABNORMAL STRESS TEST: Status: RESOLVED | Noted: 2022-09-28 | Resolved: 2024-01-15

## 2024-01-15 PROCEDURE — 99214 OFFICE O/P EST MOD 30 MIN: CPT | Performed by: FAMILY MEDICINE

## 2024-01-15 RX ORDER — ALLOPURINOL 100 MG/1
100 TABLET ORAL DAILY
Qty: 90 TABLET | Refills: 3 | Status: SHIPPED | OUTPATIENT
Start: 2024-01-15 | End: 2025-01-14

## 2024-01-15 RX ORDER — ATORVASTATIN CALCIUM 20 MG/1
20 TABLET, FILM COATED ORAL NIGHTLY
Qty: 90 TABLET | Refills: 3 | Status: SHIPPED | OUTPATIENT
Start: 2024-01-15 | End: 2025-01-14

## 2024-02-15 NOTE — PROGRESS NOTES
"Chief Complaint  Annual Exam and Med Refill    Subjective        YONG Loza presents to [facility] for lab review and medication refill.  No recent gout attacks.  Lipids are stable.  Interval improvement in labs noted.  Blood sugar now normal.  Hemoglobin A1c 5.7.          Objective   Vital Signs:   Vitals:    01/15/24 1628   BP: 106/65   BP Location: Right arm   Patient Position: Sitting   Pulse: 72   Resp: 16   Temp: 97.6 °F (36.4 °C)   TempSrc: Oral   SpO2: 96%   Weight: 79.5 kg (175 lb 3.2 oz)   Height: 185.4 cm (73\")            1/15/2024     4:30 PM   PHQ-2/PHQ-9 Depression Screening   Little Interest or Pleasure in Doing Things 0-->not at all   Feeling Down, Depressed or Hopeless 0-->not at all   PHQ-9: Brief Depression Severity Measure Score 0       BMI is within normal parameters. No other follow-up for BMI required.        Physical Exam     Result Review :     The following data was reviewed by: Juan Treadwell MD on 01/15/2024:  Uric Acid (01/09/2024 08:07)-normal  MicroAlbumin, Urine, Random - Urine, Clean Catch (01/09/2024 08:07)  Hemoglobin A1c (01/09/2024 08:07)-5.7  CK (01/09/2024 08:07)  Lipid Panel (01/09/2024 08:07)-interval improvement  CBC & Differential (01/09/2024 08:07)  Comprehensive Metabolic Panel (01/09/2024 08:07)-normal         Assessment and Plan    Assessment & Plan  Other hyperlipidemia  The current medical regimen is effective;  continue present plan and medications.    Idiopathic chronic gout of left foot without tophus  The current medical regimen is effective;  continue present plan and medications.    Need for hepatitis C screening test  Check labs in 1 year    Orders Placed This Encounter   Procedures    Hepatitis C Antibody    Comprehensive Metabolic Panel    Lipid Panel With / Chol / HDL Ratio    CK    Uric Acid    CBC & Differential     New Medications Ordered This Visit   Medications    allopurinol (ZYLOPRIM) 100 MG tablet     Sig: Take 1 tablet " by mouth Daily.     Dispense:  90 tablet     Refill:  3    atorvastatin (LIPITOR) 20 MG tablet     Sig: Take 1 tablet by mouth Every Night.     Dispense:  90 tablet     Refill:  3          Follow Up   Return in about 1 year (around 1/15/2025) for Adult wellness & medication appt, schedule 30 min.  Patient was given instructions and counseling regarding his condition or for health maintenance advice. Please see specific information pulled into the AVS if appropriate.    99 Patient/Caregiver provided printed discharge information.

## 2024-02-19 ENCOUNTER — TELEPHONE (OUTPATIENT)
Dept: FAMILY MEDICINE CLINIC | Facility: CLINIC | Age: 59
End: 2024-02-19
Payer: COMMERCIAL

## 2024-02-19 DIAGNOSIS — Z12.5 SCREENING PSA (PROSTATE SPECIFIC ANTIGEN): Primary | ICD-10-CM

## 2024-02-19 NOTE — TELEPHONE ENCOUNTER
Patient is calling in regarding lab orders. The PSA was never put in and patient needs it for his upcoming lab appt.

## 2024-02-19 NOTE — TELEPHONE ENCOUNTER
Caller: Dago Zambrano    Relationship to patient: Self    Best call back number: [phone]    Patient is needing: PATIENT IS NEEDING TO RESCHEDULE LABS, HUB COULD NOT WARM TRANSFER.

## 2024-08-05 ENCOUNTER — OFFICE VISIT (OUTPATIENT)
Dept: FAMILY MEDICINE CLINIC | Facility: CLINIC | Age: 59
End: 2024-08-05
Payer: COMMERCIAL

## 2024-08-05 VITALS
HEIGHT: 73 IN | OXYGEN SATURATION: 96 % | DIASTOLIC BLOOD PRESSURE: 70 MMHG | TEMPERATURE: 98 F | BODY MASS INDEX: 23.11 KG/M2 | SYSTOLIC BLOOD PRESSURE: 124 MMHG | HEART RATE: 91 BPM

## 2024-08-05 DIAGNOSIS — R63.0 LACK OF APPETITE: ICD-10-CM

## 2024-08-05 DIAGNOSIS — R50.9 FEVER, UNSPECIFIED FEVER CAUSE: ICD-10-CM

## 2024-08-05 DIAGNOSIS — U07.1 COVID-19 VIRUS INFECTION: Primary | ICD-10-CM

## 2024-08-05 DIAGNOSIS — R09.81 NASAL CONGESTION: ICD-10-CM

## 2024-08-05 DIAGNOSIS — R53.83 OTHER FATIGUE: ICD-10-CM

## 2024-08-05 DIAGNOSIS — R68.83 CHILLS: ICD-10-CM

## 2024-08-05 DIAGNOSIS — R05.9 COUGH, UNSPECIFIED TYPE: ICD-10-CM

## 2024-08-05 LAB
EXPIRATION DATE: ABNORMAL
EXPIRATION DATE: NORMAL
FLUAV AG NPH QL: NEGATIVE
FLUBV AG NPH QL: NEGATIVE
INTERNAL CONTROL: ABNORMAL
INTERNAL CONTROL: NORMAL
Lab: ABNORMAL
Lab: NORMAL
SARS-COV-2 AG UPPER RESP QL IA.RAPID: DETECTED

## 2024-08-05 PROCEDURE — 87804 INFLUENZA ASSAY W/OPTIC: CPT | Performed by: FAMILY MEDICINE

## 2024-08-05 PROCEDURE — 87426 SARSCOV CORONAVIRUS AG IA: CPT | Performed by: FAMILY MEDICINE

## 2024-08-05 PROCEDURE — 99214 OFFICE O/P EST MOD 30 MIN: CPT | Performed by: FAMILY MEDICINE

## 2024-08-05 RX ORDER — ONDANSETRON 4 MG/1
4 TABLET, FILM COATED ORAL EVERY 8 HOURS PRN
Qty: 12 TABLET | Refills: 0 | Status: SHIPPED | OUTPATIENT
Start: 2024-08-05 | End: 2024-08-17

## 2024-08-05 RX ORDER — NIRMATRELVIR AND RITONAVIR 300-100 MG
3 KIT ORAL 2 TIMES DAILY
Qty: 30 TABLET | Refills: 0 | Status: SHIPPED | OUTPATIENT
Start: 2024-08-05 | End: 2024-08-10

## 2024-08-05 NOTE — PROGRESS NOTES
"Chief Complaint  Cough, Nasal Congestion, Fever, Chills, and Fatigue (/)    Subjective        Cough  Associated symptoms include chills and a fever.   Fever   Associated symptoms include coughing.   Chills  Associated symptoms include chills, coughing, fatigue and a fever.   Fatigue  Associated symptoms include chills, coughing, fatigue and a fever.         The patient presents in person for evaluation of COVID-19 infection. His sister is listening in via phone.    The patient's sister initially suspected a COVID-19 infection due to the patient's history of severe COVID-19 infections. The patient's last COVID-19 infection occurred 2 years ago, during which he was hospitalized for 6 days. Currently, he is experiencing generalized weakness, nausea, and difficulty in eating. His sleep is disrupted, and he has experienced significant weight loss. Nausea is a problem     Review of Systems   Constitutional:  Positive for chills, fatigue and fever.   Respiratory:  Positive for cough.         Vital Signs:   Vitals:    08/05/24 1324   BP: 124/70   Pulse: 91   Temp: 98 °F (36.7 °C)   SpO2: 96%   Weight: Comment: too weak, we skipped it   Height: 185.4 cm (73\")            1/15/2024     4:30 PM   PHQ-2/PHQ-9 Depression Screening   Little Interest or Pleasure in Doing Things 0-->not at all   Feeling Down, Depressed or Hopeless 0-->not at all   PHQ-9: Brief Depression Severity Measure Score 0       BMI is within normal parameters. No other follow-up for BMI required.        Physical Exam  Vitals reviewed.   Constitutional:       Appearance: He is ill-appearing (mild).   HENT:      Nose: Congestion and rhinorrhea present.   Cardiovascular:      Rate and Rhythm: Normal rate and regular rhythm.   Pulmonary:      Effort: Pulmonary effort is normal. No respiratory distress.      Breath sounds: Normal breath sounds.   Skin:     General: Skin is warm and dry.   Neurological:      Mental Status: He is alert.   Psychiatric:         Mood " and Affect: Mood normal.          Clear lungs.  Normal heart sounds.    Vital Signs  Oxygen level is at 96 percent.       The following data was reviewed by: Juan Treadwell MD on 08/05/2024:  POCT SARS-CoV-2 Antigen FAINA (08/05/2024 13:42)  POCT Influenza A/B (08/05/2024 13:41)    Results  Laboratory Studies  Covid test positive.                Assessment and Plan     Orders Placed This Encounter   Procedures    POCT SARS-CoV-2 Antigen FAINA    POCT Influenza A/B     New Medications Ordered This Visit   Medications    Nirmatrelvir & Ritonavir, 300mg/100mg, (Paxlovid, 300/100,)     Sig: Take 3 tablets by mouth 2 (Two) Times a Day for 5 days.     Dispense:  30 tablet     Refill:  0     If unable to break a box, update sig with remaining and discarded doses and dispense full box.     Order Specific Question:   I have reviewed the patient's medication list prior to prescribing Paxlovid due to the risk of serious adverse reactions secondary to drug interactions. I will consult with pharmacy, if needed     Answer:   Patient's medication list reviewed    ondansetron (Zofran) 4 MG tablet     Sig: Take 1 tablet by mouth Every 8 (Eight) Hours As Needed for Nausea or Vomiting for up to 12 days.     Dispense:  12 tablet     Refill:  0        1. Acute COVID-19 infection.  The patient will be treated with Paxlovid. Symptomatic care, a short course of Zofran for nausea, hydration, and rest are recommended. It is recommended that he abstains from work this week. The patient is advised to seek immediate medical attention at the emergency room if symptoms change or worsen, or if his oxygen level drops below 90 percent. The recommendation is to withhold his atorvastatin while on the Paxlovid.    Follow-up  Follow-up visits will be scheduled as necessary.       Patient was given instructions and counseling regarding his condition or for health maintenance advice. Please see specific information pulled into the AVS if appropriate.      Patient or patient representative verbalized consent for the use of Ambient Listening during the visit with  Juan Treadwell MD for chart documentation. 8/5/2024  13:53 EDT

## 2024-08-05 NOTE — LETTER
August 5, 2024     Patient: Dago Zambrano   YOB: 1965   Date of Visit: 8/5/2024       To Whom It May Concern:    It is my medical opinion that Dago Zambrano should remain off work this week but may return to work next Monday. Work absence is due to acute illness.         Sincerely,        Juan Treadwell MD    CC: No Recipients

## 2024-08-09 ENCOUNTER — TELEPHONE (OUTPATIENT)
Dept: FAMILY MEDICINE CLINIC | Facility: CLINIC | Age: 59
End: 2024-08-09
Payer: COMMERCIAL

## 2024-08-09 NOTE — TELEPHONE ENCOUNTER
PATIENT STATES HE IS VERY DIZZY AND FATIGUED. SAW DR. HINTON FOR COVID THIS PAST MONDAY. PATIENT IS TAKING PAXLOVID

## 2024-08-12 ENCOUNTER — OFFICE VISIT (OUTPATIENT)
Dept: FAMILY MEDICINE CLINIC | Facility: CLINIC | Age: 59
End: 2024-08-12
Payer: COMMERCIAL

## 2024-08-12 VITALS
SYSTOLIC BLOOD PRESSURE: 120 MMHG | DIASTOLIC BLOOD PRESSURE: 76 MMHG | BODY MASS INDEX: 22.8 KG/M2 | OXYGEN SATURATION: 99 % | WEIGHT: 172 LBS | HEART RATE: 80 BPM | HEIGHT: 73 IN

## 2024-08-12 DIAGNOSIS — U09.9 COVID-19 LONG HAULER: ICD-10-CM

## 2024-08-12 DIAGNOSIS — U07.1 COVID-19 VIRUS INFECTION: Primary | ICD-10-CM

## 2024-08-12 PROCEDURE — 99213 OFFICE O/P EST LOW 20 MIN: CPT | Performed by: FAMILY MEDICINE

## 2024-08-12 NOTE — ASSESSMENT & PLAN NOTE
Patient has had symptoms of COVID-19 long-hauler especially fatigue and weakness since his initial diagnosis COVID-19 approximately 2 years ago.  He has not completely recovered.  We did talk about increasing his cardiovascular fitness by starting walking a little this week and gradually increasing the distance and time daily of walking.  Referral to physical therapy has been set up and he should maybe think about that over the next couple of weeks after he gets over this acute illness

## 2024-08-12 NOTE — PROGRESS NOTES
"Chief Complaint  Office Visit    Subjective        HPI      The patient presents for evaluation of chest pain. His sister listened in on cell phone.    Since his previous COVID-19 infection, he has been experiencing a soreness in his chest and a general feeling of internal discomfort. Despite some improvement, he still experiences fatigue and a slight cough. His sleep has been limited to 10 hours a day since his last COVID-19 infection. He also reports leg discomfort, which he feels has not fully recovered from during his previous COVID-19 infection. He describes a sensation of his legs being \"dead\". He has been exercising every morning since his last COVID-19 infection, which includes stretching and weight lifting. However, he has not been walking recently. He also experienced dizziness last Wednesday and Thursday.           Vital Signs:   Vitals:    08/12/24 1518   BP: 120/76   Pulse: 80   SpO2: 99%   Weight: 78 kg (172 lb)   Height: 185.4 cm (73\")            1/15/2024     4:30 PM   PHQ-2/PHQ-9 Depression Screening   Little Interest or Pleasure in Doing Things 0-->not at all   Feeling Down, Depressed or Hopeless 0-->not at all   PHQ-9: Brief Depression Severity Measure Score 0       BMI is within normal parameters. No other follow-up for BMI required.        Physical Exam  Vitals reviewed.   Constitutional:       General: He is not in acute distress.  Cardiovascular:      Rate and Rhythm: Normal rate and regular rhythm.      Heart sounds: Normal heart sounds.   Pulmonary:      Effort: Pulmonary effort is normal.      Breath sounds: Normal breath sounds.   Neurological:      General: No focal deficit present.      Mental Status: He is alert.   Psychiatric:         Attention and Perception: Attention normal.         Mood and Affect: Mood normal.         Behavior: Behavior normal.                       Results                  Assessment and Plan     Orders Placed This Encounter   Procedures    Ambulatory Referral to " Physical Therapy for Evaluation & Treatment        Assessment & Plan  COVID-19 virus infection  Slow progress towards recovery.  Minimal cough persist.  Some anterior chest symptoms.  Patient will stay off work this week and return on August 20, 2024.  Letter created  COVID-19 long haspencer  Patient has had symptoms of COVID-19 long-hauler especially fatigue and weakness since his initial diagnosis COVID-19 approximately 2 years ago.  He has not completely recovered.  We did talk about increasing his cardiovascular fitness by starting walking a little this week and gradually increasing the distance and time daily of walking.  Referral to physical therapy has been set up and he should maybe think about that over the next couple of weeks after he gets over this acute illness     Return in about 2 months (around 10/12/2024) for recheck of current condition.     Patient was given instructions and counseling regarding his condition or for health maintenance advice. Please see specific information pulled into the AVS if appropriate.     Patient or patient representative verbalized consent for the use of Ambient Listening during the visit with  Juan Treadwell MD for chart documentation. 8/12/2024  16:12 EDT

## 2024-08-12 NOTE — LETTER
August 12, 2024     Patient: Dago Zambrano   YOB: 1965   Date of Visit: 8/12/2024       To Whom it May Concern:    Dago Zambrano was seen in my clinic on 8/12/2024. He should stay off work and  may return to work on 8/20/24. Work absence is due to acute illness.            Sincerely,          Juan Treadwell MD        CC: No Recipients

## 2024-08-12 NOTE — LETTER
August 12, 2024     Patient: Dago Zambrano   YOB: 1965   Date of Visit: 8/12/2024       To Whom It May Concern:    It is my medical opinion that Dago Zambrano may return to work in two days.         Sincerely,        Juan Treadwell MD    CC: No Recipients

## 2024-11-27 LAB
ALBUMIN SERPL-MCNC: 4.6 G/DL (ref 3.8–4.9)
ALP SERPL-CCNC: 64 IU/L (ref 44–121)
ALT SERPL-CCNC: 16 IU/L (ref 0–44)
AST SERPL-CCNC: 19 IU/L (ref 0–40)
BASOPHILS # BLD AUTO: 0 X10E3/UL (ref 0–0.2)
BASOPHILS NFR BLD AUTO: 1 %
BILIRUB SERPL-MCNC: 0.6 MG/DL (ref 0–1.2)
BUN SERPL-MCNC: 20 MG/DL (ref 8–27)
BUN/CREAT SERPL: 17 (ref 10–24)
CALCIUM SERPL-MCNC: 9.3 MG/DL (ref 8.6–10.2)
CHLORIDE SERPL-SCNC: 104 MMOL/L (ref 96–106)
CHOLEST SERPL-MCNC: 151 MG/DL (ref 100–199)
CHOLEST/HDLC SERPL: 2.7 RATIO (ref 0–5)
CK SERPL-CCNC: 144 U/L (ref 41–331)
CO2 SERPL-SCNC: 26 MMOL/L (ref 20–29)
CREAT SERPL-MCNC: 1.19 MG/DL (ref 0.76–1.27)
EGFRCR SERPLBLD CKD-EPI 2021: 70 ML/MIN/1.73
EOSINOPHIL # BLD AUTO: 0.1 X10E3/UL (ref 0–0.4)
EOSINOPHIL NFR BLD AUTO: 3 %
ERYTHROCYTE [DISTWIDTH] IN BLOOD BY AUTOMATED COUNT: 13.4 % (ref 11.6–15.4)
GLOBULIN SER CALC-MCNC: 2.3 G/DL (ref 1.5–4.5)
GLUCOSE SERPL-MCNC: 104 MG/DL (ref 70–99)
HCT VFR BLD AUTO: 49.1 % (ref 37.5–51)
HCV IGG SERPL QL IA: NON REACTIVE
HDLC SERPL-MCNC: 55 MG/DL
HGB BLD-MCNC: 15.3 G/DL (ref 13–17.7)
IMM GRANULOCYTES # BLD AUTO: 0 X10E3/UL (ref 0–0.1)
IMM GRANULOCYTES NFR BLD AUTO: 0 %
LDLC SERPL CALC-MCNC: 78 MG/DL (ref 0–99)
LYMPHOCYTES # BLD AUTO: 1.5 X10E3/UL (ref 0.7–3.1)
LYMPHOCYTES NFR BLD AUTO: 36 %
MCH RBC QN AUTO: 28.2 PG (ref 26.6–33)
MCHC RBC AUTO-ENTMCNC: 31.2 G/DL (ref 31.5–35.7)
MCV RBC AUTO: 91 FL (ref 79–97)
MONOCYTES # BLD AUTO: 0.3 X10E3/UL (ref 0.1–0.9)
MONOCYTES NFR BLD AUTO: 8 %
NEUTROPHILS # BLD AUTO: 2.2 X10E3/UL (ref 1.4–7)
NEUTROPHILS NFR BLD AUTO: 52 %
PLATELET # BLD AUTO: 167 X10E3/UL (ref 150–450)
POTASSIUM SERPL-SCNC: 4.6 MMOL/L (ref 3.5–5.2)
PROT SERPL-MCNC: 6.9 G/DL (ref 6–8.5)
PSA SERPL-MCNC: 2.8 NG/ML (ref 0–4)
RBC # BLD AUTO: 5.42 X10E6/UL (ref 4.14–5.8)
SODIUM SERPL-SCNC: 142 MMOL/L (ref 134–144)
TRIGL SERPL-MCNC: 96 MG/DL (ref 0–149)
URATE SERPL-MCNC: 4.1 MG/DL (ref 3.8–8.4)
VLDLC SERPL CALC-MCNC: 18 MG/DL (ref 5–40)
WBC # BLD AUTO: 4.2 X10E3/UL (ref 3.4–10.8)

## 2025-01-21 ENCOUNTER — HOSPITAL ENCOUNTER (OUTPATIENT)
Dept: CARDIOLOGY | Facility: HOSPITAL | Age: 60
Discharge: HOME | End: 2025-01-21
Admitting: INTERNAL MEDICINE
Payer: COMMERCIAL

## 2025-01-21 ENCOUNTER — TELEPHONE (OUTPATIENT)
Dept: CARDIOLOGY | Facility: CLINIC | Age: 60
End: 2025-01-21
Payer: COMMERCIAL

## 2025-01-21 ENCOUNTER — OFFICE VISIT (OUTPATIENT)
Dept: CARDIOLOGY | Facility: CLINIC | Age: 60
End: 2025-01-21
Payer: COMMERCIAL

## 2025-01-21 VITALS
SYSTOLIC BLOOD PRESSURE: 110 MMHG | WEIGHT: 184 LBS | BODY MASS INDEX: 24.39 KG/M2 | HEIGHT: 73 IN | DIASTOLIC BLOOD PRESSURE: 68 MMHG | HEART RATE: 64 BPM

## 2025-01-21 DIAGNOSIS — I25.10 NONOBSTRUCTIVE ATHEROSCLEROSIS OF CORONARY ARTERY: ICD-10-CM

## 2025-01-21 DIAGNOSIS — I25.10 NONOBSTRUCTIVE ATHEROSCLEROSIS OF CORONARY ARTERY: Primary | ICD-10-CM

## 2025-01-21 LAB — HCYS SERPL-MCNC: 8.2 UMOL/L (ref 0–15)

## 2025-01-21 PROCEDURE — 93000 ELECTROCARDIOGRAM COMPLETE: CPT | Performed by: INTERNAL MEDICINE

## 2025-01-21 PROCEDURE — 99214 OFFICE O/P EST MOD 30 MIN: CPT | Performed by: INTERNAL MEDICINE

## 2025-01-21 PROCEDURE — 83090 ASSAY OF HOMOCYSTEINE: CPT | Performed by: INTERNAL MEDICINE

## 2025-01-21 PROCEDURE — 36415 COLL VENOUS BLD VENIPUNCTURE: CPT

## 2025-01-21 NOTE — PROGRESS NOTES
Date of Office Visit: 2025  Encounter Provider: Batsheva Paul MD  Place of Service: [facility] CARDIOLOGY  Patient Name: Dago Zambrano  :1965    Chief complaint  Coronary artery disease    History of Present Illness  The patient is a 59 yo female with prediabetes and hyperlipidemia who was admitted with chest pain, abdominal pain weight loss, abnormal EKG with anterolateral ST T wave changes and COVID pneumonia in 2022. ECHO with LVEF 59%, indeterminate diastolic function and left ventricular hypertrophy with trivial valvular regurgitation and normal RVSP. Stress perfusion was abnormal with inferior wall ischemia. Cardiac cath showed normal LV systolic function with calcified nonobstructive coronary disease.       Since last visit patient is walking 6 times a week.  He has had no chest pain shortness breath palpitation syncope near syncope.  Blood work that he points from [facility] dated 2024 showed CBC that was unremarkable CMP with glucose 104.  LDL 78, HDL 55, triglycerides 96 PSA was normal.  CPK was normal.        Past Medical History:   Diagnosis Date    COVID-19     Elevated blood sugar 2018    Fatty liver 2022    Gout     Grief reaction     Hyperlipidemia     Nocturia 2018    0-1x/night    Skin cancer     Thrombocytopenia 2022     Past Surgical History:   Procedure Laterality Date    CARDIAC CATHETERIZATION N/A 2022    Procedure: Left Heart Cath;  Surgeon: Jaydon Means MD;  Location: [facility] CATH INVASIVE LOCATION;  Service: Cardiology;  Laterality: N/A;    CARDIAC CATHETERIZATION N/A 2022    Procedure: Left ventriculography;  Surgeon: Jaydon Means MD;  Location: [facility] CATH INVASIVE LOCATION;  Service: Cardiology;  Laterality: N/A;    CARDIAC CATHETERIZATION N/A 2022    Procedure: Coronary angiography;  Surgeon: Jaydon Means MD;  Location: [facility] CATH INVASIVE LOCATION;  Service: Cardiology;  Laterality: N/A;     COLONOSCOPY N/A 9/27/2022    Procedure: COLONOSCOPY to cecum/ terminal ileum with cold snare polypectomy;  Surgeon: Nathaniel Lomax MD;  Location: [facility] ENDOSCOPY;  Service: Gastroenterology;  Laterality: N/A;  pre- abd pain  post- normal ti, polyp, hemorrhoids     ENDOSCOPY N/A 9/27/2022    Procedure: ESOPHAGOGASTRODUODENOSCOPY with bx;  Surgeon: Nathaniel Lomax MD;  Location: [facility] ENDOSCOPY;  Service: Gastroenterology;  Laterality: N/A;  pre- abd pain   post- irregular z line      Outpatient Medications Prior to Visit   Medication Sig Dispense Refill    acetaminophen (TYLENOL) 325 MG tablet Take 2 tablets by mouth Every 6 (Six) Hours As Needed for Mild Pain.      allopurinol (ZYLOPRIM) 100 MG tablet Take 1 tablet by mouth Daily. 90 tablet 3    aspirin 81 MG EC tablet Take 1 tablet by mouth Daily.      atorvastatin (LIPITOR) 20 MG tablet Take 1 tablet by mouth Every Night. 90 tablet 3    ondansetron (Zofran) 4 MG tablet Take 1 tablet by mouth Every 8 (Eight) Hours As Needed for Nausea or Vomiting for up to 12 days. 12 tablet 0     No facility-administered medications prior to visit.       Allergies as of 01/21/2025 - Reviewed 01/21/2025   Allergen Reaction Noted    Declomycin [demeclocycline]  07/26/2016    Lincocin [lincomycin hcl]  07/26/2016    Pediamycin [erythromycin]  07/26/2016    Penicillins  07/26/2016    Sulfa antibiotics  07/26/2016     Social History     Socioeconomic History    Marital status: Single   Tobacco Use    Smoking status: Former     Types: Cigarettes    Smokeless tobacco: Never   Vaping Use    Vaping status: Never Used   Substance and Sexual Activity    Alcohol use: Yes     Comment: 2-3 days per weeks (6 packs)    Drug use: Never    Sexual activity: Defer     History reviewed. No pertinent family history.  Review of Systems   Constitutional: Negative for chills, fever, weight gain and weight loss.   Cardiovascular:  Negative for leg swelling.   Respiratory:  Negative for cough,  "snoring and wheezing.    Hematologic/Lymphatic: Negative for bleeding problem. Does not bruise/bleed easily.   Skin:  Negative for color change.   Musculoskeletal:  Negative for falls, joint pain and myalgias.   Gastrointestinal:  Negative for melena.   Genitourinary:  Negative for hematuria.   Neurological:  Negative for excessive daytime sleepiness.   Psychiatric/Behavioral:  Negative for depression. The patient is not nervous/anxious.         Objective:     Vitals:    01/21/25 1043   BP: 110/68   Pulse: 64   Weight: 83.5 kg (184 lb)   Height: 185.4 cm (73\")     Body mass index is 24.28 kg/m².    Vitals reviewed.   Constitutional:       Appearance: Well-developed.   Eyes:      General: No scleral icterus.        Right eye: No discharge.      Conjunctiva/sclera: Conjunctivae normal.      Pupils: Pupils are equal, round, and reactive to light.   HENT:      Head: Normocephalic.      Nose: Nose normal.   Neck:      Thyroid: No thyromegaly.      Vascular: No JVD.   Pulmonary:      Effort: Pulmonary effort is normal. No respiratory distress.      Breath sounds: Normal breath sounds. No wheezing. No rales.   Cardiovascular:      Normal rate. Regular rhythm. Normal S1. Normal S2.       Murmurs: There is no murmur.      No gallop.    Pulses:     Intact distal pulses.      Carotid: 2+ bilaterally.     Radial: 2+ bilaterally.     Femoral: 2+ bilaterally.     Popliteal: 2+ bilaterally.     Dorsalis pedis: 2+ bilaterally.     Posterior tibial: 2+ bilaterally.  Edema:     Peripheral edema absent.   Abdominal:      General: Bowel sounds are normal. There is no distension.      Palpations: Abdomen is soft.      Tenderness: There is no abdominal tenderness. There is no rebound.   Musculoskeletal: Normal range of motion.         General: No tenderness.      Cervical back: Normal range of motion and neck supple. Skin:     General: Skin is warm and dry.      Findings: No erythema or rash.   Neurological:      Mental Status: Alert and " oriented to person, place, and time.   Psychiatric:         Behavior: Behavior normal.         Thought Content: Thought content normal.         Judgment: Judgment normal.       Lab Review:   Lab Results - Last 18 Months   Lab Units 11/26/24  0816 01/09/24  0807   WBC x10E3/uL 4.2 4.19   RBC x10E6/uL 5.42 5.38   HEMOGLOBIN g/dL 15.3 15.6   HEMATOCRIT % 49.1 48.0   MCV fL 91 89.2   MCH pg 28.2 29.0   MCHC g/dL 31.2* 32.5   RDW % 13.4 12.7   PLATELETS x10E3/uL 167 186   NEUTROPHIL % % 52 50.6   LYMPHOCYTE % % 36 38.9   MONOCYTES % % 8 7.4   EOSINOPHIL % % 3 2.6   BASOPHIL % % 1 0.5   NEUTROS ABS x10E3/uL 2.2 2.12   LYMPHS ABS x10E3/uL 1.5 1.63   MONOS ABS x10E3/uL 0.3 0.31   EOS ABS x10E3/uL 0.1 0.11   BASOS ABS x10E3/uL 0.0 0.02   IMM GRAN % % 0 0.0   IMMATURE GRANS (ABS) x10E3/uL 0.0 0.00     Lab Results - Last 18 Months   Lab Units 11/26/24  0816 01/09/24  0807   GLUCOSE mg/dL 104* 94   BUN mg/dL 20 19   CREATININE mg/dL 1.19 1.20   SODIUM mmol/L 142 141   POTASSIUM mmol/L 4.6 4.4   CHLORIDE mmol/L 104 105   CO2 mmol/L 26 28.1   CALCIUM mg/dL 9.3 9.6   BUN / CREAT RATIO  17 15.8     Lab Results - Last 18 Months   Lab Units 11/26/24  0816 01/09/24  0807   GLUCOSE mg/dL 104* 94   BUN mg/dL 20 19   CREATININE mg/dL 1.19 1.20   SODIUM mmol/L 142 141   POTASSIUM mmol/L 4.6 4.4   CHLORIDE mmol/L 104 105   CO2 mmol/L 26 28.1   CALCIUM mg/dL 9.3 9.6   ALBUMIN g/dL 4.6 4.6   ALT (SGPT) IU/L 16 17   AST (SGOT) IU/L 19 17   ALK PHOS IU/L 64 67   BILIRUBIN mg/dL 0.6 0.5   BUN / CREAT RATIO  17 15.8     Lab Results - Last 18 Months   Lab Units 11/26/24  0816 01/09/24  0807   TRIGLYCERIDES mg/dL 96 77   HDL CHOL mg/dL 55 59   LDL CHOL mg/dL 78 83   VLDL CHOLESTEROL CATALINO mg/dL 18 15         ECG 12 Lead    Date/Time: 1/21/2025 10:54 AM  Performed by: Batsheva Paul MD    Authorized by: Batsheva Paul MD  Comparison: compared with previous ECG   Similar to previous ECG  Rhythm: sinus rhythm  Other findings comments: Chronic mild ST  elevation anterior leads consistent with early repolarization    Clinical impression: abnormal EKG            Diagnosis Plan   1. Nonobstructive atherosclerosis of coronary artery  ECG 12 Lead    Homocysteine        Plan:       Mild nonobstructive coronary disease he needs better glucose cholesterol and blood pressure control..  Continue with low-dose aspirin therapy.  Check homocystine level  Hyperlipidemia.  LDL slightly elevated on prior studies.  He will try to incorporate aerobic exercise into his regimen.  Possible prediabetes.  Should improve with increasing exercise regimen    Time Spent: I spent 35 minutes caring for Dago on this date of service. This time includes time spent by me in the following activities: preparing for the visit, reviewing tests, obtaining and/or reviewing a separately obtained history, performing a medically appropriate examination and/or evaluation, counseling and educating the patient/family/caregiver, ordering medications, tests, or procedures, documenting information in the medical record, and independently interpreting results and communicating that information with the patient/family/caregiver.   I spent 1 minutes on the separately reported service of ECG. This time is not included in the time used to support the E/M service also reported today.        Your medication list            Accurate as of January 21, 2025 11:59 PM. If you have any questions, ask your nurse or doctor.                CONTINUE taking these medications        Instructions Last Dose Given Next Dose Due   acetaminophen 325 MG tablet  Commonly known as: TYLENOL      Take 2 tablets by mouth Every 6 (Six) Hours As Needed for Mild Pain.       allopurinol 100 MG tablet  Commonly known as: ZYLOPRIM      Take 1 tablet by mouth Daily.       aspirin 81 MG EC tablet      Take 1 tablet by mouth Daily.       atorvastatin 20 MG tablet  Commonly known as: LIPITOR      Take 1 tablet by mouth Every Night.                 Patient is no longer taking -.  I corrected the med list to reflect this.  I did not stop these medications.      Dictated utilizing Dragon dictation

## 2025-01-21 NOTE — TELEPHONE ENCOUNTER
I spoke with Dago Zambrano and updated pt on results from provider.  Pt verbalized understanding and has no further questions at this time.    Thank you,    Kiana JOHN, RN  Triage [facility]  01/21/25 15:35 EST

## 2025-02-17 ENCOUNTER — OFFICE VISIT (OUTPATIENT)
Dept: FAMILY MEDICINE CLINIC | Facility: CLINIC | Age: 60
End: 2025-02-17
Payer: COMMERCIAL

## 2025-02-17 VITALS
BODY MASS INDEX: 24.52 KG/M2 | WEIGHT: 185 LBS | HEART RATE: 78 BPM | OXYGEN SATURATION: 97 % | HEIGHT: 73 IN | SYSTOLIC BLOOD PRESSURE: 114 MMHG | DIASTOLIC BLOOD PRESSURE: 70 MMHG

## 2025-02-17 DIAGNOSIS — Z23 IMMUNIZATION DUE: ICD-10-CM

## 2025-02-17 DIAGNOSIS — M54.50 CHRONIC MIDLINE LOW BACK PAIN WITHOUT SCIATICA: ICD-10-CM

## 2025-02-17 DIAGNOSIS — Z00.01 ENCOUNTER FOR WELL ADULT EXAM WITH ABNORMAL FINDINGS: Primary | ICD-10-CM

## 2025-02-17 DIAGNOSIS — I25.10 NONOBSTRUCTIVE ATHEROSCLEROSIS OF CORONARY ARTERY: ICD-10-CM

## 2025-02-17 DIAGNOSIS — G89.29 CHRONIC MIDLINE LOW BACK PAIN WITHOUT SCIATICA: ICD-10-CM

## 2025-02-17 DIAGNOSIS — M1A.0720 IDIOPATHIC CHRONIC GOUT OF LEFT FOOT WITHOUT TOPHUS: ICD-10-CM

## 2025-02-17 DIAGNOSIS — E78.49 OTHER HYPERLIPIDEMIA: ICD-10-CM

## 2025-02-17 PROCEDURE — 90656 IIV3 VACC NO PRSV 0.5 ML IM: CPT | Performed by: FAMILY MEDICINE

## 2025-02-17 PROCEDURE — 99396 PREV VISIT EST AGE 40-64: CPT | Performed by: FAMILY MEDICINE

## 2025-02-17 PROCEDURE — 99213 OFFICE O/P EST LOW 20 MIN: CPT | Performed by: FAMILY MEDICINE

## 2025-02-17 PROCEDURE — 90472 IMMUNIZATION ADMIN EACH ADD: CPT | Performed by: FAMILY MEDICINE

## 2025-02-17 PROCEDURE — 90471 IMMUNIZATION ADMIN: CPT | Performed by: FAMILY MEDICINE

## 2025-02-17 PROCEDURE — 90715 TDAP VACCINE 7 YRS/> IM: CPT | Performed by: FAMILY MEDICINE

## 2025-02-17 RX ORDER — ATORVASTATIN CALCIUM 40 MG/1
40 TABLET, FILM COATED ORAL NIGHTLY
Qty: 90 TABLET | Refills: 3 | Status: SHIPPED | OUTPATIENT
Start: 2025-02-17 | End: 2026-02-17

## 2025-02-17 RX ORDER — ALLOPURINOL 100 MG/1
100 TABLET ORAL DAILY
Qty: 90 TABLET | Refills: 3 | Status: SHIPPED | OUTPATIENT
Start: 2025-02-17 | End: 2026-02-17

## 2025-02-17 NOTE — ASSESSMENT & PLAN NOTE
Pain ongoing.  Check x-ray lumbar spine.  Make an appointment after x-ray to discuss results.  Patient may actually need MRI and referral to neurosurgeon depending on test results.  Orders:    XR Spine Lumbar 4+ View; Future

## 2025-02-17 NOTE — ASSESSMENT & PLAN NOTE
The current medical regimen is effective;  continue present plan and medications.    Orders:    allopurinol (ZYLOPRIM) 100 MG tablet; Take 1 tablet by mouth Daily.     not examined No

## 2025-02-17 NOTE — PROGRESS NOTES
"Chief Complaint  Physical and Back Pain (Radiating to legs and feet, needs to see speicalist)    Subjective        HPI      The patient is a 59-year-old male who presents for evaluation of back pain.    He has been experiencing persistent lower middle back pain, which has significantly worsened over the past few months. The pain is severe enough to disrupt his sleep and hinder his ability to rise from bed. He reports that the pain is localized to the back and does not radiate down the legs. He has not sought any medical attention for this issue and has not undergone any recent imaging studies. Despite these symptoms, he maintains good control over his bowel and bladder functions. He has a history of back issues dating back to his youth, which were exacerbated by a fall on ice a few months ago. He has attempted physical therapy and home exercises, but these interventions have not provided relief. He has also discontinued golfing due to the severity of his back pain.    He has been diagnosed with atherosclerosis of the heart blood vessels. He is currently on a regimen of atorvastatin 20 mg, allopurinol, and baby aspirin. He has reduced his alcohol consumption to once a week and has been advised to increase his cardiovascular exercise and walking.    He has been experiencing severe leg pain for the past week, which he describes as more intense than the \"dead legs\" sensation he experienced during his long-haul COVID-19 illness. This pain has been so debilitating that it has prevented him from walking. He reports sleeping 10 to 11 hours a day and does not feel depressed.    SOCIAL HISTORY  - Reduced alcohol consumption to drinking one day a week    FAMILY HISTORY  - Niece had a herniated disc causing bladder problems and is scheduled for surgery    MEDICATIONS  - Atorvastatin  - Allopurinol  - Baby aspirin    IMMUNIZATIONS  - Received two doses of the COVID-19 vaccine     Review of Systems   Constitutional:  Negative for " "fatigue.   Musculoskeletal:  Positive for back pain.   Neurological:  Positive for weakness.   All other systems reviewed and are negative.       Vital Signs:   Vitals:    02/17/25 1527   BP: 114/70   Pulse: 78   SpO2: 97%   Weight: 83.9 kg (185 lb)   Height: 185.4 cm (73\")            2/17/2025     3:31 PM   PHQ-2/PHQ-9 Depression Screening   Little interest or pleasure in doing things Not at all   Feeling down, depressed, or hopeless Not at all       BMI is within normal parameters. No other follow-up for BMI required.        Physical Exam     General Appearance: No Acute Distress, normal appearance, well developed, well nourished.  Vital signs: Vital Signs reviewed.  Respiratory: lungs clear to auscultation, no wheezes, normal respiratory effort.  Cardiovascular: Regular rate and ryhthym with no murmurs, rubs, or gallop. No carotid bruits auscultated bilaterally.  Back, Musculoskeletal: There is some spasm in the right buttock area.  Extremities: No pretibial edema, bilaterally.  Skin: Warm and dry, no rash.  Psychiatric: patient cooperative, normal affect.       The following data was reviewed by: Juan Treadwell MD on 02/17/2025:      Results  - Laboratory Studies:    - Blood sugar: 104 (November 26, 2024)    - Kidney function: Normal    - Liver tests: Normal    - Cholesterol levels: Good but could be slightly better                Assessment and Plan      1. Back pain: Chronic.  - Order x-ray of the lumbar spine at [facility].  - Discuss results after x-ray completion.  - Consider MRI and referral to a neurosurgeon based on x-ray findings.    2. Elevated LDL cholesterol: Not at goal.  on 11/26/2024. Associated with atherosclerosis of the heart blood vessels.  - Increase atorvastatin to 40 mg at bedtime.  - Follow-up lab test in approximately 4 months to monitor cholesterol levels.    3. Leg pain: Acute.  - Monitor leg pain and assess its relation to back pain after x-ray results.    4. " Medication management.  - Continue allopurinol and baby aspirin.  - Send atorvastatin prescription to [facility].    5. Health maintenance.  - Administer tetanus and influenza vaccines today.    Follow-up  - Follow up in 3 weeks.         Encounter for well adult exam with abnormal findings  Due to back pain. Immunizations given       Chronic midline low back pain without sciatica  Pain ongoing.  Check x-ray lumbar spine.  Make an appointment after x-ray to discuss results.  Patient may actually need MRI and referral to neurosurgeon depending on test results.  Orders:    XR Spine Lumbar 4+ View; Future    Other hyperlipidemia     LDL cholesterol not to goal.  Increase Lipitor to 40 mg bedtime dosing.  Orders:    atorvastatin (LIPITOR) 40 MG tablet; Take 1 tablet by mouth Every Night.    Comprehensive Metabolic Panel; Future    CBC & Differential; Future    CK; Future    Lipid Panel With / Chol / HDL Ratio; Future    Idiopathic chronic gout of left foot without tophus  The current medical regimen is effective;  continue present plan and medications.    Orders:    allopurinol (ZYLOPRIM) 100 MG tablet; Take 1 tablet by mouth Daily.    Nonobstructive atherosclerosis of coronary artery    LDL goal is less than 55       Immunization due    Orders:    Fluzone >6mos ([id number])    Tdap Vaccine Greater Than or Equal To 6yo IM       Return in about 3 weeks (around 3/10/2025) for recheck of current condition.     Patient was given instructions and counseling regarding his condition or for health maintenance advice. Please see specific information pulled into the AVS if appropriate.     Patient or patient representative verbalized consent for the use of Ambient Listening during the visit with  Juan Treadwell MD for chart documentation. 2/17/2025  15:59 EST

## 2025-02-17 NOTE — PATIENT INSTRUCTIONS
Please get an x-ray at [facility] on [address].  The x-ray department is an entrance a about a third of the way down the [address].  The order has been placed and it is a walk up, first come, first serve basis.

## 2025-02-17 NOTE — ASSESSMENT & PLAN NOTE
LDL cholesterol not to goal.  Increase Lipitor to 40 mg bedtime dosing.  Orders:    atorvastatin (LIPITOR) 40 MG tablet; Take 1 tablet by mouth Every Night.    Comprehensive Metabolic Panel; Future    CBC & Differential; Future    CK; Future    Lipid Panel With / Chol / HDL Ratio; Future

## 2025-02-19 ENCOUNTER — HOSPITAL ENCOUNTER (OUTPATIENT)
Dept: GENERAL RADIOLOGY | Facility: HOSPITAL | Age: 60
Discharge: HOME | End: 2025-02-19
Admitting: FAMILY MEDICINE
Payer: COMMERCIAL

## 2025-02-19 DIAGNOSIS — M54.50 CHRONIC MIDLINE LOW BACK PAIN WITHOUT SCIATICA: ICD-10-CM

## 2025-02-19 DIAGNOSIS — G89.29 CHRONIC MIDLINE LOW BACK PAIN WITHOUT SCIATICA: ICD-10-CM

## 2025-02-19 PROCEDURE — 72110 X-RAY EXAM L-2 SPINE 4/>VWS: CPT

## 2025-02-20 ENCOUNTER — TELEPHONE (OUTPATIENT)
Dept: FAMILY MEDICINE CLINIC | Facility: CLINIC | Age: 60
End: 2025-02-20
Payer: COMMERCIAL

## 2025-02-20 NOTE — PROGRESS NOTES
Please give the patient the following message:  Mr. Barroso knee x-ray shows no evidence of lower back fracture.  There is some significant arthritis in the low back especially at the L4-5 level.  I think we should schedule an MRI of the low back.  I have placed the order.  Make an appointment to see me after that test is completed.

## 2025-02-20 NOTE — TELEPHONE ENCOUNTER
HUB TO RELAY    Please give the patient the following message:  Mr. Barroso knee x-ray shows no evidence of lower back fracture.  There is some significant arthritis in the low back especially at the L4-5 level.  I think we should schedule an MRI of the low back.  I have placed the order.  Make an appointment to see me after that test is completed.

## 2025-02-21 NOTE — LETTER
Caller: Patricio Soto    Relationship: Self    Best call back number:     What is the best time to reach you: ANY     Who are you requesting to speak with (clinical staff, provider,  specific staff member): CLINICAL    What was the call regarding: PATIENT HAS SHOULDER SURGERY MARCH 20 AND WANTS TO KNOW IF ITS OK IF HE GETS A FLU SHOT BEFORE    Is it okay if the provider responds through MyChart: PLEASE CALL    August 12, 2024     Patient: Dago Zambrano   YOB: 1965   Date of Visit: 8/12/2024       To Whom It May Concern:    It is my medical opinion that Dago Zambrano may return to work in three days.            Sincerely,        Juan Treadwell MD    CC: No Recipients

## 2025-03-16 ENCOUNTER — HOSPITAL ENCOUNTER (OUTPATIENT)
Dept: MRI IMAGING | Facility: HOSPITAL | Age: 60
Discharge: HOME | End: 2025-03-16
Admitting: FAMILY MEDICINE
Payer: COMMERCIAL

## 2025-03-16 DIAGNOSIS — G89.29 CHRONIC MIDLINE LOW BACK PAIN WITHOUT SCIATICA: ICD-10-CM

## 2025-03-16 DIAGNOSIS — M54.50 CHRONIC MIDLINE LOW BACK PAIN WITHOUT SCIATICA: ICD-10-CM

## 2025-03-16 PROCEDURE — 72148 MRI LUMBAR SPINE W/O DYE: CPT

## 2025-04-14 ENCOUNTER — OFFICE VISIT (OUTPATIENT)
Dept: FAMILY MEDICINE CLINIC | Facility: CLINIC | Age: 60
End: 2025-04-14
Payer: COMMERCIAL

## 2025-04-14 VITALS
HEART RATE: 74 BPM | BODY MASS INDEX: 24.25 KG/M2 | WEIGHT: 183 LBS | SYSTOLIC BLOOD PRESSURE: 110 MMHG | HEIGHT: 73 IN | DIASTOLIC BLOOD PRESSURE: 70 MMHG | OXYGEN SATURATION: 98 %

## 2025-04-14 DIAGNOSIS — G89.29 CHRONIC MIDLINE LOW BACK PAIN WITHOUT SCIATICA: Primary | ICD-10-CM

## 2025-04-14 DIAGNOSIS — M47.816 ARTHRITIS OF LUMBAR SPINE: ICD-10-CM

## 2025-04-14 DIAGNOSIS — M54.50 CHRONIC MIDLINE LOW BACK PAIN WITHOUT SCIATICA: Primary | ICD-10-CM

## 2025-04-14 PROCEDURE — 99213 OFFICE O/P EST LOW 20 MIN: CPT | Performed by: FAMILY MEDICINE

## 2025-04-14 RX ORDER — ACETAMINOPHEN 500 MG
1000 TABLET ORAL 3 TIMES DAILY PRN
COMMUNITY
Start: 2025-04-14 | End: 2026-04-14

## 2025-04-14 NOTE — ASSESSMENT & PLAN NOTE
Information about MRIs discussed during today's visit.  We will set up physical therapy for intermittent problem with low back.  Orders:    Ambulatory Referral to Physical Therapy for Evaluation & Treatment    acetaminophen (TYLENOL) 500 MG tablet; Take 2 tablets by mouth 3 (Three) Times a Day As Needed for Mild Pain or Moderate Pain. Do not exceed 3000 mg daily

## 2025-04-14 NOTE — PROGRESS NOTES
"Chief Complaint  Follow-up (XRAY/MRI)    Subjective        HPI      The patient presents for evaluation of back pain.    He experienced a severe episode of back pain last week, which rendered him incapacitated for approximately one week. He reports no leg pain in the past few weeks. He has not previously engaged in physical therapy and is considering nursing home due to the persistent nature of his back pain. His daily activities are significantly impacted by his condition, including difficulty with bending over to shower. He expresses a desire to resume golfing within the next 1 to 2 years and to maintain a normal lifestyle. He reports that his range of motion is satisfactory today, having completed a workout session this morning. However, he notes stiffness when extending his back. He is currently on a regimen of baby aspirin and two other medications and is open to the addition of Tylenol to his treatment plan.    He is interested in getting Cologuard test done. He had a colonoscopy in the past, which was normal.    MEDICATIONS  Current: Baby aspirin           Vital Signs:   Vitals:    04/14/25 1143   BP: 110/70   Pulse: 74   SpO2: 98%   Weight: 83 kg (183 lb)   Height: 185.4 cm (73\")            2/17/2025     3:31 PM   PHQ-2/PHQ-9 Depression Screening   Little interest or pleasure in doing things Not at all   Feeling down, depressed, or hopeless Not at all       BMI is within normal parameters. No other follow-up for BMI required.        Physical Exam     General Appearance: Normal appearance.  HEENT: Within normal limits.  Respiratory: No Respiratory Distress, lungs clear to auscultation, no wheezes, no rubs.  Cardiovascular: regular rate and rythym with no murmurs, rubs, or gallop. No carotid bruits auscultated.  Back, Musculoskeletal: Musculoskeletal exam shows good range of motion with twisting to the right and left. Lateral flexion to the left side causes pain on the right side, and lateral flexion to the " right causes pain. There is some tension in the sacroiliac joints.  Skin: Warm and dry, no rash.  Neurological: Normal.  Psychiatric: Normal.  Other observations: no pretibial edema.       The following data was reviewed by: Juan Treadwell MD on 04/14/2025:  MRI Lumbar Spine Without Contrast (03/16/2025 16:58)     Results  Imaging  MRI of the back shows arthritis at L4-L5, mild foraminal stenosis on both sides at L3-L4, no herniated disk at L4-L5, mild foraminal stenosis on both sides at L4-L5, mild disease at L5-S1, and arthritis at several levels.                Assessment and Plan      1. Low back pain.  The underlying cause of the low back pain is attributed to multilevel arthritis, as evidenced by the MRI findings discussed during today's visit. The MRI showed arthritis at L4-5, mild foraminal stenosis at L3-4 and L4-5, and mild disease at L5-S1 without any herniated disks. Physical therapy will be initiated to manage the intermittent low back pain. He is advised to learn the exercises well, as they will be a lifelong regimen. Acetaminophen has been prescribed for use as needed to alleviate discomfort.    2. Health maintenance.  He is interested in getting Cologuard test done. He had a colonoscopy in the past, which was normal. A regular visit will be scheduled in 9 months for a physical examination, adult wellness check, and medication refill. Preventative medicine, including immunizations and Cologuard, will be discussed during this appointment. If his previous colonoscopy was normal, he may not need another one until 2032.    Follow-up  The patient will follow up in 9 months.         Chronic midline low back pain without sciatica  Information about MRIs discussed during today's visit.  We will set up physical therapy for intermittent problem with low back.  Orders:    Ambulatory Referral to Physical Therapy for Evaluation & Treatment    acetaminophen (TYLENOL) 500 MG tablet; Take 2 tablets by mouth 3 (Three)  Times a Day As Needed for Mild Pain or Moderate Pain. Do not exceed 3000 mg daily    Arthritis of lumbar spine  Underlying issue low back pain is due to multilevel arthritis.  Will try physical therapy  Orders:    Ambulatory Referral to Physical Therapy for Evaluation & Treatment    acetaminophen (TYLENOL) 500 MG tablet; Take 2 tablets by mouth 3 (Three) Times a Day As Needed for Mild Pain or Moderate Pain. Do not exceed 3000 mg daily       Return in about 9 months (around 1/14/2026), or if symptoms worsen or fail to improve, for Adult wellness & medication appt, schedule 30 min.     Patient was given instructions and counseling regarding his condition or for health maintenance advice. Please see specific information pulled into the AVS if appropriate.     Patient or patient representative verbalized consent for the use of Ambient Listening during the visit with  Juan Treadwell MD for chart documentation. 4/14/2025  12:01 EDT

## 2025-04-14 NOTE — ASSESSMENT & PLAN NOTE
Underlying issue low back pain is due to multilevel arthritis.  Will try physical therapy  Orders:    Ambulatory Referral to Physical Therapy for Evaluation & Treatment    acetaminophen (TYLENOL) 500 MG tablet; Take 2 tablets by mouth 3 (Three) Times a Day As Needed for Mild Pain or Moderate Pain. Do not exceed 3000 mg daily

## 2025-04-30 ENCOUNTER — TELEPHONE (OUTPATIENT)
Dept: FAMILY MEDICINE CLINIC | Facility: CLINIC | Age: 60
End: 2025-04-30
Payer: COMMERCIAL

## 2025-04-30 NOTE — TELEPHONE ENCOUNTER
HUB TO RELAY  Left patient a voicemail regarding his lab that were order by his provider instructed patient to contact our office to schedule his lab only appt.

## 2025-05-16 ENCOUNTER — TREATMENT (OUTPATIENT)
Dept: PHYSICAL THERAPY | Facility: CLINIC | Age: 60
End: 2025-05-16
Payer: COMMERCIAL

## 2025-05-16 DIAGNOSIS — M54.50 CHRONIC MIDLINE LOW BACK PAIN WITHOUT SCIATICA: Primary | ICD-10-CM

## 2025-05-16 DIAGNOSIS — G89.29 CHRONIC MIDLINE LOW BACK PAIN WITHOUT SCIATICA: Primary | ICD-10-CM

## 2025-05-16 DIAGNOSIS — M47.816 ARTHRITIS OF LUMBAR SPINE: ICD-10-CM

## 2025-05-16 DIAGNOSIS — M25.69 BACK STIFFNESS: ICD-10-CM

## 2025-05-16 PROCEDURE — 97535 SELF CARE MNGMENT TRAINING: CPT

## 2025-05-16 PROCEDURE — 97162 PT EVAL MOD COMPLEX 30 MIN: CPT

## 2025-05-16 PROCEDURE — 97110 THERAPEUTIC EXERCISES: CPT

## 2025-05-16 NOTE — PROGRESS NOTES
"    Physical Therapy Initial Evaluation and Plan of Care  [facility]  [place] - [address], [address]     [place] [address]   Phone: [phone]   Fax: [phone]    Patient: Dago Zambrano   : 1965  Diagnosis/ICD-10 Code:  Chronic midline low back pain without sciatica [M54.50, G89.29]  Referring practitioner: Juan Treadwell MD  Date of Initial Visit: 2025  Today's Date: 2025  Patient seen for 1 session         Visit Diagnoses:    ICD-10-CM ICD-9-CM   1. Chronic midline low back pain without sciatica  M54.50 724.2    G89.29 338.29   2. Arthritis of lumbar spine  M47.816 721.3   3. Back stiffness  M25.69 724.8         Subjective Questionnaire: Back Index: 58% disability       Subjective Evaluation    History of Present Illness  Mechanism of injury: Patient is a 60 y/o male who presents to physical therapy with the diagnosis of acute on chronic low back pain. Patient reports initial onset of low back pain began insidiously without specific onset. He reports occasionally his back will \"go out\", and will feel disabled for about a week at a time in which he walks like a \"question serge\" per the patient. He reports aggravating factors include lifting, bending forward, sneezing/coughing can all be inciting events for his back pain. He reports when his back \"goes out\" and often has to lay in bed for several days before it starts to feel better. He denies use of heat, medication, or any pain medication. He denies any numbness/tingling or weakness into the lower extremities. He does report trying to stretch on a regular basis.     He reports having history of long Covid and reports generally feeling weak throughout the lower extremities.       Patient Occupation: postal service (ample amounts of driving) Pain  Current pain ratin  At best pain ratin  At worst pain ratin  Location: mid-line low back  Quality: dull ache, tight and sharp  Relieving " "factors: change in position and rest  Aggravating factors: lifting and squatting  Progression: no change    Diagnostic Tests  MRI studies: abnormal    Patient Goals  Patient goals for therapy: decreased pain, increased motion and increased strength  Patient goal: return to golf         MRI per MD note: \"MRI of the back shows arthritis at L4-L5, mild foraminal stenosis on both sides at L3-L4, no herniated disk at L4-L5, mild foraminal stenosis on both sides at L4-L5, mild disease at L5-S1, and arthritis at several levels.\"    Pertinent PMH: hyperlipemia, gout, long COVID    Objective          Postural Observations    Additional Postural Observation Details  Patient stands with reduced lumbar lordosis, reduced lumbar mobility throughout functional mobility.    Palpation     Additional Palpation Details  Tenderness upon palpation to lumbar paraspinals. No specific tenderness reported upon palpation to glutes/piriformis.     Neurological Testing     Sensation     Lumbar   Left   Intact: light touch    Right   Intact: light touch    Active Range of Motion     Lumbar   Flexion: 120 degrees   Extension: 8 degrees   Left lateral flexion: 32 degrees   Right lateral flexion: 18 degrees   Left rotation: 50 (% limited) degrees   Right rotation: 50 (% limited) degrees     Passive Range of Motion     Additional Passive Range of Motion Details  Mild limitation into bilateral hip ROM with pain reported into the lumbar spine with end range mobility of either hip into all planes of mobility.     Strength/Myotome Testing     Left Hip   Planes of Motion   Flexion: 4+  Extension: 4+  Abduction: 4+  Adduction: 4+    Right Hip   Planes of Motion   Flexion: 4+  Extension: 4+  Abduction: 4+  Adduction: 4+    Left Knee   Flexion: 4+  Extension: 4+    Right Knee   Flexion: 4+  Extension: 4+    Left Ankle/Foot   Dorsiflexion: 4+  Plantar flexion: 4+    Right Ankle/Foot   Dorsiflexion: 4+  Plantar flexion: 4+    Additional Strength Details  4/5 " core strength via Sahrmann abdominal muscle grading system    Tests     Lumbar     Left   Negative passive SLR.     Right   Negative passive SLR.           Assessment & Plan       Assessment  Impairments: abnormal muscle tone, abnormal or restricted ROM, activity intolerance, impaired physical strength, lacks appropriate home exercise program and pain with function   Functional limitations: carrying objects, lifting, sleeping, walking, pulling, pushing, uncomfortable because of pain, moving in bed, sitting, standing and stooping   Assessment details: The patient is a 59 y.o. male who presents to physical therapy today for acute on chronic low back pain without radiating symptoms into the lower extremities. Upon initial evaluation, the patient demonstrates the following impairments: mobility deficits of the lumbar spine, mild mobility deficits of bilateral hips, mild decrease in bilateral lower extremity muscle power and mild core strength deficits, and with an abnormal posture. 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 The patient would benefit from skilled PT services to address functional limitations and impairments and to improve patient quality of life.      Prognosis: good    Goals  Plan Goals: ST. Pt will be independent and compliant with initial HEP in 4 weeks.  2. Pt will report a 50% improvement in symptoms since starting therapy in 4 weeks.  3. Pt will report pain level at worst <2 during walking activity x30 minutes in 4 weeks.  4. Pt will be independent with postural corrections in 2 weeks in order to decrease strain on back.     LT. Pt will be independent with final HEP for self-management of condition by LAILA.  2. Pt  will improve score on Oswesty to less than 40% by DC.   3. Pt will report a 75% improvement in symptoms by DC in order to allow return to PLOF.  4. Pt will improve lumbar AROM to WFL into all planes in order to complete ADLs with improved function by DC.   5. Pt will report gradual return to golf to include chipping/putting x30 minutes without increase in low back pain.     Plan  Therapy options: will be seen for skilled therapy services  Planned modality interventions: cryotherapy, electrical stimulation/Russian stimulation, TENS, traction, thermotherapy (hydrocollator packs) and dry needling  Planned therapy interventions: abdominal trunk stabilization, body mechanics training, flexibility, functional ROM exercises, gait training, joint mobilization, home exercise program, manual therapy, neuromuscular re-education, postural training, soft tissue mobilization, spinal/joint mobilization, strengthening, stretching, therapeutic activities and motor coordination training  Frequency: 2x week  Duration in weeks: 8  Treatment plan discussed with: patient        History # of Personal Factors and/or Comorbidities: MODERATE (1-2)  Examination of Body System(s): # of elements: MODERATE (3)  Clinical Presentation: EVOLVING  Clinical Decision Making: MODERATE      Timed:         Manual Therapy:         mins  14231;     Therapeutic Exercise:    20     mins  09924;     Neuromuscular Luisa:        mins  99572;    Therapeutic Activity:          mins  82069;     Gait Training:           mins  20793;     Ultrasound:          mins  68708;    Ionto                                   mins   96643  Self Care                       10     mins   53222      Un-Timed:  Electrical Stimulation:         mins  91207 ( );  Dry Needling          mins self-pay  Traction          mins 86302  Low Eval          Mins  50848  Mod Eval     30     Mins  14771  High Eval                            Mins  07587  Canalith Repos         mins  74247      Timed Treatment:   30   mins   Total Treatment:     60   mins          PT: Hao Carvajal PT     License Number: [place] [id number]# [id number]. [place] LIC# [id number]  Electronically signed by Hao Carvajal PT, 05/16/25, 8:35 AM EDT    Certification Period: 5/16/2025 thru 8/13/2025  I certify that the therapy services are furnished while this patient is under my care.  The services outlined above are required by this patient, and will be reviewed every 90 days.         Physician Signature:__________________________________________________    PHYSICIAN: Juan Treadwell MD  NPI: [id number]                                      DATE:      Please sign and return via fax to [phone]. Thank you, [facility].

## 2025-06-19 ENCOUNTER — TREATMENT (OUTPATIENT)
Dept: PHYSICAL THERAPY | Facility: CLINIC | Age: 60
End: 2025-06-19
Payer: COMMERCIAL

## 2025-06-19 DIAGNOSIS — G89.29 CHRONIC MIDLINE LOW BACK PAIN WITHOUT SCIATICA: Primary | ICD-10-CM

## 2025-06-19 DIAGNOSIS — M25.69 BACK STIFFNESS: ICD-10-CM

## 2025-06-19 DIAGNOSIS — M47.816 ARTHRITIS OF LUMBAR SPINE: ICD-10-CM

## 2025-06-19 DIAGNOSIS — M54.50 CHRONIC MIDLINE LOW BACK PAIN WITHOUT SCIATICA: Primary | ICD-10-CM

## 2025-06-19 NOTE — PROGRESS NOTES
Physical Therapy Progress Note  [facility]  [place] - [address], [address]     [place] [address]   Phone: [phone]   Fax: [phone]      Patient: Dago Zambrano   : 1965  Referring practitioner: Juan Treadwell MD  Date of Initial Visit: Type: THERAPY  Noted: 2025  Today's Date: 2025  Patient seen for 2 sessions       Visit Diagnoses:    ICD-10-CM ICD-9-CM   1. Chronic midline low back pain without sciatica  M54.50 724.2    G89.29 338.29   2. Arthritis of lumbar spine  M47.816 721.3   3. Back stiffness  M25.69 724.8     Subjective Questionnaire: Back Index: 68% disability     Subjective:  Dago Zambrano reports: no dramatic changes since the IE. Has completed HEP regularly for the first 2 weeks, but reports going on a family vacation, and did not complete as often. Reports having bad turbulence during the flight, which aggravated his low back. Doing relatively okay today, would like to review HEP.       Objective   See Exercise, Manual, and Modality Logs for complete treatment.     Active Range of Motion      Lumbar   Flexion: 120 degrees   Extension: 10 degrees   Left lateral flexion: 32 degrees   Right lateral flexion: 20 degrees   Left rotation: 40 (% limited) degrees   Right rotation: 40 (% limited) degrees      Passive Range of Motion      Additional Passive Range of Motion Details  Mild limitation into bilateral hip ROM with pain reported into the lumbar spine with end range mobility of either hip into all planes of mobility.      Strength/Myotome Testing      Left Hip   Planes of Motion   Flexion: 4+  Extension: 4+  Abduction: 4+  Adduction: 4+     Right Hip   Planes of Motion   Flexion: 4+  Extension: 4+  Abduction: 4+  Adduction: 4+     Left Knee   Flexion: 4+  Extension: 4+     Right Knee   Flexion: 4+  Extension: 4+     Left Ankle/Foot   Dorsiflexion: 4+  Plantar flexion: 4+     Right Ankle/Foot   Dorsiflexion: 4+  Plantar flexion: 4+      Additional Strength Details  4/5 core strength via Sahrmann abdominal muscle grading system     Tests      Lumbar      Left   Negative passive SLR.      Right   Negative passive SLR.    Assessment:  Patient is a 60 y/o male who presents to his 2nd physical therapy for acute on chronic low back pain with associated mobility deficits. He demonstrates similar lumbar ROM compared to IE. He requires mild to moderate amounts of verbal and tactile cueing for appropriate completion of HEP. Due to financial strain, he is unable to attend physical therapy regularly, so much of treatment focus on solidifying home exercise program. Provided MHP followed by percussive therapy to lumbar paraspinals with subjective improvements in pain levels. Treatment also focused at muscle relaxation, improving trunk mobility, and core strengthening and given additional printouts of current HEP. Will follow up in 2-3 weeks to progress HEP as needed.     Goals: ST. Pt will be independent and compliant with initial HEP in 4 weeks. - Progressing  2. Pt will report a 50% improvement in symptoms since starting therapy in 4 weeks. - Not met  3. Pt will report pain level at worst <2 during walking activity x30 minutes in 4 weeks. - Not met  4. Pt will be independent with postural corrections in 2 weeks in order to decrease strain on back. - not met     LT. Pt will be independent with final HEP for self-management of condition by DC.  2. Pt will improve score on Oswesty to less than 40% by DC.   3. Pt will report a 75% improvement in symptoms by DC in order to allow return to PLOF.  4. Pt will improve lumbar AROM to WFL into all planes in order to complete ADLs with improved function by DC.   5. Pt will report gradual return to golf to include chipping/putting x30 minutes without increase in low back pain.     Plan:   Progress HEP, provide interventions for muscle relaxation, trunk flexibility, and core strengthening as tolerated. Consider  additional exercises to improve LE strength        Timed:         Manual Therapy:    10     mins  44929;     Therapeutic Exercise:    25     mins  97907;     Neuromuscular Luisa:    14    mins  44589;    Therapeutic Activity:          mins  25187;     Gait Training:           mins  43141;     Ultrasound:          mins  29000;    Ionto                                   mins  73540  Self Care                            mins  89996  Traction          mins 75595      Un-Timed:  Canalith Repos         mins 57422  Electrical Stimulation:         mins  83836 ( );  Dry Needling          mins self-pay  Traction          mins 49340        Timed Treatment:   49   mins   Total Treatment:     49   mins    Hao Carvajal PT  License Number: [place] [id number]# [id number]. [place] LIC# [id number]    Physical Therapist

## 2025-07-10 ENCOUNTER — TREATMENT (OUTPATIENT)
Dept: PHYSICAL THERAPY | Facility: CLINIC | Age: 60
End: 2025-07-10
Payer: COMMERCIAL

## 2025-07-10 DIAGNOSIS — M47.816 ARTHRITIS OF LUMBAR SPINE: ICD-10-CM

## 2025-07-10 DIAGNOSIS — M25.69 BACK STIFFNESS: ICD-10-CM

## 2025-07-10 DIAGNOSIS — M54.50 CHRONIC MIDLINE LOW BACK PAIN WITHOUT SCIATICA: Primary | ICD-10-CM

## 2025-07-10 DIAGNOSIS — G89.29 CHRONIC MIDLINE LOW BACK PAIN WITHOUT SCIATICA: Primary | ICD-10-CM

## 2025-07-10 NOTE — PROGRESS NOTES
Physical Therapy Re Certification Of Plan of Care  [place] - [address], [address] [place] [address]   Phone: [phone] Fax: [phone]   Patient: Dago Zambrano   : 1965  Diagnosis/ICD-10 Code:  Chronic midline low back pain without sciatica [M54.50, G89.29]  Referring practitioner: Juan Treadwell MD  Date of Initial Visit: Type: THERAPY  Noted: 2025  Today's Date: 7/10/2025  Patient seen for 3 sessions         Visit Diagnoses:    ICD-10-CM ICD-9-CM   1. Chronic midline low back pain without sciatica  M54.50 724.2    G89.29 338.29   2. Arthritis of lumbar spine  M47.816 721.3   3. Back stiffness  M25.69 724.8         Dago Zambrano reports: Dago Zambrano reports: good improvements in symptoms from last treatment session, and reports completing his HEP regularly with some improvement from last treatment session. Continues to have moderate low back pain. Reports travelling and spending ample time in the car, and walking several miles and reports increase. Reports about 30-40% improvement in overall symptoms.      Clinical Progress: improved  Home Program Compliance: Yes  Treatment has included: therapeutic exercise, neuromuscular re-education, manual therapy, therapeutic activity, and gait training      Objective   Active Range of Motion      Lumbar   Flexion: 120 degrees   Extension: 10 degrees   Left lateral flexion: 32 degrees   Right lateral flexion: 22 degrees   Left rotation: 30 (% limited) degrees   Right rotation: 30 (% limited) degrees      Passive Range of Motion      Additional Passive Range of Motion Details  Mild limitation into bilateral hip ROM with pain reported into the lumbar spine with end range mobility of either hip into all planes of mobility.      Strength/Myotome Testing      Left Hip   Planes of Motion   Flexion: 4+  Extension: 4+  Abduction: 4+  Adduction: 4+     Right Hip   Planes of Motion   Flexion: 4+  Extension: 4+  Abduction: 4+  Adduction: 4+     Left  Knee   Flexion: 5  Extension: 5     Right Knee   Flexion: 5  Extension: 5     Left Ankle/Foot   Dorsiflexion: 5  Plantar flexion: 5     Right Ankle/Foot   Dorsiflexion: 5  Plantar flexion: 5     Additional Strength Details  4/5 core strength via Sahrmann abdominal muscle grading system     Assessment/Plan  Patient is a 60 y/o male who presents to physical therapy with the diagnosis of chronic low back pain with mobility deficits. He demonstrates mild improvements in low back ROM, and core strength. He has responded favorably to manual techniques and use pain relieving modalities in the clinic. He would benefit from continued physical therapy with increased frequency as he reports improvements in symptoms, but would benefit from increased amounts of guidance. Continues to report moderate amounts of pain and reports difficulty with walking, transfers from supine to standing, and lifting greater than 10-15 lbs, but demonstrates mild improvements thus far.      Recommendations: Continue with recommendations 1-2x/week  Timeframe: 2 months  Prognosis to achieve goals: good      Timed:         Manual Therapy:    10     mins  64950;     Therapeutic Exercise:    20     mins  11959;     Neuromuscular Luisa:        mins  73353;    Therapeutic Activity:          mins  57389;     Gait Training:           mins  50183;     Ultrasound:          mins  48440;    Ionto                                   mins   48008  Self Care                            mins   61188    Un-Timed:  Electrical Stimulation:         mins  69888 ( );  Dry Needling          mins self-pay  Traction          mins 79235  Re-Eval                               mins  76987  Canalith Repos         mins 27217    Timed Treatment:   30   mins   Total Treatment:     40   mins          PT: Hao Carvajal PT     License Number: [place] [id number]# [id number]. [place] LIC# [id number]  Electronically signed by Hao Carvajal PT, 07/10/25, 8:19 AM EDT    Certification Period:  7/10/2025 thru 10/7/2025  I certify that the therapy services are furnished while this patient is under my care.  The services outlined above are required by this patient, and will be reviewed every 90 days.         Physician Signature:__________________________________________________    PHYSICIAN: Juan Treadwell MD  NPI: [id number]                                      DATE:  :     Please sign and return via fax to [phone] . Thank you, [facility]

## 2025-07-23 ENCOUNTER — TREATMENT (OUTPATIENT)
Dept: PHYSICAL THERAPY | Facility: CLINIC | Age: 60
End: 2025-07-23
Payer: COMMERCIAL

## 2025-07-23 DIAGNOSIS — M54.50 CHRONIC MIDLINE LOW BACK PAIN WITHOUT SCIATICA: Primary | ICD-10-CM

## 2025-07-23 DIAGNOSIS — M25.69 BACK STIFFNESS: ICD-10-CM

## 2025-07-23 DIAGNOSIS — G89.29 CHRONIC MIDLINE LOW BACK PAIN WITHOUT SCIATICA: Primary | ICD-10-CM

## 2025-07-23 DIAGNOSIS — M47.816 ARTHRITIS OF LUMBAR SPINE: ICD-10-CM

## 2025-07-23 NOTE — PROGRESS NOTES
"Physical Therapy Daily Treatment Note  [facility]  [place] - [address], [address]     [place] [address]   Phone: [phone]   Fax: [phone]      Patient: Dago Zambrano   : 1965  Referring practitioner: Juan Treadwell MD  Date of Initial Visit: Type: THERAPY  Noted: 2025  Today's Date: 2025  Patient seen for 4 sessions       Visit Diagnoses:    ICD-10-CM ICD-9-CM   1. Chronic midline low back pain without sciatica  M54.50 724.2    G89.29 338.29   2. Arthritis of lumbar spine  M47.816 721.3   3. Back stiffness  M25.69 724.8         Subjective:  Dago Zambrano reports: low back is feeling a bit improved. Having some soreness into bilateral legs. Reports working as [employer] has become a bit more taxing recently. Feeling more sore into bilateral legs. \"Sometimes they feel a bit heavy, like there's nothing there.\"       Objective   See Exercise, Manual, and Modality Logs for complete treatment.       Assessment:  Patient tolerates today's movement interventions well without adverse effects. Continue to provide MHP and percussive therapy with grade I, II lumbar P-A joint mobs for subjective. Prescribed SLR into flexion and abduction for building lower extremity strength/tone and added to HEP. Good participation in HEP, and continues to benefit from skilled PT to improve trunk mobility, core and lower extremity strengthening.       Plan:   Progress core and Le strength as tolerated. Consider javi stretch. Review HEP as needed. MT/MHP for pain management as needed.        Timed:         Manual Therapy:    12     mins  70676;     Therapeutic Exercise:    18     mins  72744;     Neuromuscular Luisa:    12    mins  42061;    Therapeutic Activity:          mins  53025;     Gait Training:           mins  34004;     Ultrasound:          mins  45383;    Ionto                                   mins  86626  Self Care                            mins  21553  Traction        "   mins 09056      Un-Timed:  Canalith Repos         mins 88837  Electrical Stimulation:         mins  47604 ( );  Dry Needling          mins self-pay  Traction          mins 02403        Timed Treatment:   42   mins   Total Treatment:     42   mins    Hao Carvajal PT  License Number: [place] [id number]# [id number]. [place] LIC# [id number]    Physical Therapist

## 2025-08-06 ENCOUNTER — TREATMENT (OUTPATIENT)
Dept: PHYSICAL THERAPY | Facility: CLINIC | Age: 60
End: 2025-08-06
Payer: COMMERCIAL

## 2025-08-06 DIAGNOSIS — M25.69 BACK STIFFNESS: ICD-10-CM

## 2025-08-06 DIAGNOSIS — G89.29 CHRONIC MIDLINE LOW BACK PAIN WITHOUT SCIATICA: Primary | ICD-10-CM

## 2025-08-06 DIAGNOSIS — M47.816 ARTHRITIS OF LUMBAR SPINE: ICD-10-CM

## 2025-08-06 DIAGNOSIS — M54.50 CHRONIC MIDLINE LOW BACK PAIN WITHOUT SCIATICA: Primary | ICD-10-CM

## 2025-08-11 ENCOUNTER — TELEPHONE (OUTPATIENT)
Dept: FAMILY MEDICINE CLINIC | Facility: CLINIC | Age: 60
End: 2025-08-11
Payer: COMMERCIAL

## 2025-08-20 ENCOUNTER — TREATMENT (OUTPATIENT)
Dept: PHYSICAL THERAPY | Facility: CLINIC | Age: 60
End: 2025-08-20
Payer: COMMERCIAL

## 2025-08-20 DIAGNOSIS — G89.29 CHRONIC MIDLINE LOW BACK PAIN WITHOUT SCIATICA: Primary | ICD-10-CM

## 2025-08-20 DIAGNOSIS — M47.816 ARTHRITIS OF LUMBAR SPINE: ICD-10-CM

## 2025-08-20 DIAGNOSIS — M25.69 BACK STIFFNESS: ICD-10-CM

## 2025-08-20 DIAGNOSIS — M54.50 CHRONIC MIDLINE LOW BACK PAIN WITHOUT SCIATICA: Primary | ICD-10-CM

## 2025-08-22 ENCOUNTER — TELEPHONE (OUTPATIENT)
Dept: FAMILY MEDICINE CLINIC | Facility: CLINIC | Age: 60
End: 2025-08-22
Payer: COMMERCIAL

## 2025-08-25 ENCOUNTER — OFFICE VISIT (OUTPATIENT)
Dept: FAMILY MEDICINE CLINIC | Facility: CLINIC | Age: 60
End: 2025-08-25
Payer: COMMERCIAL

## 2025-08-25 VITALS
OXYGEN SATURATION: 98 % | BODY MASS INDEX: 25.13 KG/M2 | DIASTOLIC BLOOD PRESSURE: 80 MMHG | HEIGHT: 73 IN | SYSTOLIC BLOOD PRESSURE: 106 MMHG | HEART RATE: 76 BPM | WEIGHT: 189.6 LBS | TEMPERATURE: 98.2 F

## 2025-08-25 DIAGNOSIS — M54.50 CHRONIC MIDLINE LOW BACK PAIN WITHOUT SCIATICA: Primary | ICD-10-CM

## 2025-08-25 DIAGNOSIS — M47.816 ARTHRITIS OF LUMBAR SPINE: ICD-10-CM

## 2025-08-25 DIAGNOSIS — G89.29 CHRONIC FOOT PAIN, RIGHT: ICD-10-CM

## 2025-08-25 DIAGNOSIS — M79.671 CHRONIC FOOT PAIN, RIGHT: ICD-10-CM

## 2025-08-25 DIAGNOSIS — G89.29 CHRONIC MIDLINE LOW BACK PAIN WITHOUT SCIATICA: Primary | ICD-10-CM

## 2025-08-25 PROCEDURE — 99214 OFFICE O/P EST MOD 30 MIN: CPT | Performed by: NURSE PRACTITIONER

## 2025-08-25 RX ORDER — INDOMETHACIN 50 MG/1
50 CAPSULE ORAL 3 TIMES DAILY PRN
Qty: 30 CAPSULE | Refills: 0 | Status: SHIPPED | OUTPATIENT
Start: 2025-08-25

## (undated) DEVICE — CANN O2 ETCO2 FITS ALL CONN CO2 SMPL A/ 7IN DISP LF

## (undated) DEVICE — KT ORCA ORCAPOD DISP STRL

## (undated) DEVICE — KT MANIFLD CARDIAC

## (undated) DEVICE — GLIDESHEATH BASIC HYDROPHILIC COATED INTRODUCER SHEATH: Brand: GLIDESHEATH

## (undated) DEVICE — SINGLE-USE POLYPECTOMY SNARE: Brand: CAPTIVATOR II

## (undated) DEVICE — CATH DIAG IMPULSE FR4 5F 100CM

## (undated) DEVICE — CATH VENT MIV RADL PIG ST TIP 4F 110CM

## (undated) DEVICE — TUBING, SUCTION, 1/4" X 10', STRAIGHT: Brand: MEDLINE

## (undated) DEVICE — FRCP BX RADJAW4 NDL 2.8 240CM LG OG BX40

## (undated) DEVICE — ADAPT CLN BIOGUARD AIR/H2O DISP

## (undated) DEVICE — LN SMPL CO2 SHTRM SD STREAM W/M LUER

## (undated) DEVICE — BITEBLOCK OMNI BLOC

## (undated) DEVICE — SENSR O2 OXIMAX FNGR A/ 18IN NONSTR

## (undated) DEVICE — GW EMR FIX EXCHG J STD .035 3MM 260CM

## (undated) DEVICE — PK CATH CARD 40

## (undated) DEVICE — THE SINGLE USE ETRAP – POLYP TRAP IS USED FOR SUCTION RETRIEVAL OF ENDOSCOPICALLY REMOVED POLYPS.: Brand: ETRAP

## (undated) DEVICE — CATH DIAG IMPULSE FL3.5 5F 100CM